# Patient Record
Sex: MALE
[De-identification: names, ages, dates, MRNs, and addresses within clinical notes are randomized per-mention and may not be internally consistent; named-entity substitution may affect disease eponyms.]

---

## 2019-04-01 ENCOUNTER — HOSPITAL ENCOUNTER (OUTPATIENT)
Dept: HOSPITAL 19 - ZCOL.LAB | Age: 34
End: 2019-04-01
Attending: SURGERY
Payer: COMMERCIAL

## 2019-04-01 DIAGNOSIS — Z01.89: Primary | ICD-10-CM

## 2020-09-28 ENCOUNTER — HOSPITAL ENCOUNTER (OUTPATIENT)
Dept: HOSPITAL 19 - ZCOL.LAB | Age: 35
End: 2020-09-28
Attending: SURGERY
Payer: COMMERCIAL

## 2020-09-28 DIAGNOSIS — L02.11: Primary | ICD-10-CM

## 2020-12-02 ENCOUNTER — HOSPITAL ENCOUNTER (OUTPATIENT)
Dept: HOSPITAL 19 - COL.RAD | Age: 35
End: 2020-12-02
Attending: NURSE PRACTITIONER
Payer: COMMERCIAL

## 2020-12-02 DIAGNOSIS — R10.9: ICD-10-CM

## 2020-12-02 DIAGNOSIS — R19.5: Primary | ICD-10-CM

## 2020-12-02 LAB
ALBUMIN SERPL-MCNC: 4.2 GM/DL (ref 3.5–5)
ALP SERPL-CCNC: 97 U/L (ref 50–136)
ALT SERPL-CCNC: 27 U/L (ref 4–49)
ANION GAP SERPL CALC-SCNC: 10 MMOL/L (ref 7–16)
AST SERPL-CCNC: 28 U/L (ref 15–37)
BASOPHILS # BLD: 0.1 10*3/UL (ref 0–0.2)
BASOPHILS NFR BLD AUTO: 0.7 % (ref 0–2)
BILIRUB SERPL-MCNC: 0.5 MG/DL (ref 0–1)
BUN SERPL-MCNC: 9 MG/DL (ref 9–20)
CALCIUM SERPL-MCNC: 9.5 MG/DL (ref 8.4–10.2)
CHLORIDE SERPL-SCNC: 102 MMOL/L (ref 98–107)
CO2 SERPL-SCNC: 28 MMOL/L (ref 22–30)
CREAT SERPL-SCNC: 0.72 UMOL/L (ref 0.66–1.25)
EOSINOPHIL # BLD: 0.3 10*3/UL (ref 0–0.7)
EOSINOPHIL NFR BLD: 2.3 % (ref 0–4)
ERYTHROCYTE [DISTWIDTH] IN BLOOD BY AUTOMATED COUNT: 13.8 % (ref 11.5–14.5)
GLUCOSE SERPL-MCNC: 88 MG/DL (ref 74–106)
GRANULOCYTES # BLD AUTO: 68.7 % (ref 42.2–75.2)
HCT VFR BLD AUTO: 42.1 % (ref 42–52)
HGB BLD-MCNC: 13.8 G/DL (ref 13.5–18)
LYMPHOCYTES # BLD: 3.1 10*3/UL (ref 1.2–3.4)
LYMPHOCYTES NFR BLD: 21.4 % (ref 20–51)
MCH RBC QN AUTO: 28 PG (ref 27–31)
MCHC RBC AUTO-ENTMCNC: 33 G/DL (ref 33–37)
MCV RBC AUTO: 86 FL (ref 80–100)
MONOCYTES # BLD: 1 10*3/UL (ref 0.1–0.6)
MONOCYTES NFR BLD AUTO: 6.6 % (ref 1.7–9.3)
NEUTROPHILS # BLD: 10.1 10*3/UL (ref 1.4–6.5)
PLATELET # BLD AUTO: 437 K/MM3 (ref 130–400)
PMV BLD AUTO: 9.1 FL (ref 7.4–10.4)
POTASSIUM SERPL-SCNC: 3.8 MMOL/L (ref 3.4–5)
PROT SERPL-MCNC: 9.5 GM/DL (ref 6.4–8.2)
RBC # BLD AUTO: 4.9 M/MM3 (ref 4.2–5.6)
SODIUM SERPL-SCNC: 140 MMOL/L (ref 137–145)

## 2021-07-04 ENCOUNTER — HOSPITAL ENCOUNTER (INPATIENT)
Dept: HOSPITAL 19 - COL.ER | Age: 36
LOS: 5 days | Discharge: HOME | DRG: 392 | End: 2021-07-09
Attending: SURGERY | Admitting: SURGERY
Payer: COMMERCIAL

## 2021-07-04 VITALS — SYSTOLIC BLOOD PRESSURE: 130 MMHG | TEMPERATURE: 98.8 F | DIASTOLIC BLOOD PRESSURE: 67 MMHG | HEART RATE: 98 BPM

## 2021-07-04 VITALS — DIASTOLIC BLOOD PRESSURE: 77 MMHG | HEART RATE: 101 BPM | TEMPERATURE: 97.3 F | SYSTOLIC BLOOD PRESSURE: 130 MMHG

## 2021-07-04 VITALS — DIASTOLIC BLOOD PRESSURE: 75 MMHG | HEART RATE: 94 BPM | TEMPERATURE: 98 F | SYSTOLIC BLOOD PRESSURE: 125 MMHG

## 2021-07-04 VITALS — WEIGHT: 315 LBS | HEIGHT: 69.02 IN | BODY MASS INDEX: 46.65 KG/M2

## 2021-07-04 VITALS — HEART RATE: 105 BPM | DIASTOLIC BLOOD PRESSURE: 66 MMHG | TEMPERATURE: 98.5 F | SYSTOLIC BLOOD PRESSURE: 152 MMHG

## 2021-07-04 DIAGNOSIS — K57.20: Primary | ICD-10-CM

## 2021-07-04 DIAGNOSIS — Z87.891: ICD-10-CM

## 2021-07-04 DIAGNOSIS — E66.01: ICD-10-CM

## 2021-07-04 LAB
ALBUMIN SERPL-MCNC: 4 GM/DL (ref 3.5–5)
ALP SERPL-CCNC: 88 U/L (ref 50–136)
ALT SERPL-CCNC: 20 U/L (ref 4–49)
ANION GAP SERPL CALC-SCNC: 6 MMOL/L (ref 7–16)
ANION GAP SERPL CALC-SCNC: 7 MMOL/L (ref 7–16)
AST SERPL-CCNC: 19 U/L (ref 15–37)
BASOPHILS # BLD: 0.1 10*3/UL (ref 0–0.2)
BASOPHILS NFR BLD AUTO: 0.3 % (ref 0–2)
BILIRUB SERPL-MCNC: 0.5 MG/DL (ref 0–1)
BUN SERPL-MCNC: 13 MG/DL (ref 9–20)
BUN SERPL-MCNC: 14 MG/DL (ref 9–20)
CALCIUM SERPL-MCNC: 9.1 MG/DL (ref 8.4–10.2)
CALCIUM SERPL-MCNC: 9.3 MG/DL (ref 8.4–10.2)
CHLORIDE SERPL-SCNC: 104 MMOL/L (ref 98–107)
CHLORIDE SERPL-SCNC: 106 MMOL/L (ref 98–107)
CO2 SERPL-SCNC: 24 MMOL/L (ref 22–30)
CO2 SERPL-SCNC: 24 MMOL/L (ref 22–30)
CREAT SERPL-SCNC: 0.77 UMOL/L (ref 0.66–1.25)
CREAT SERPL-SCNC: 0.84 UMOL/L (ref 0.66–1.25)
CRP SERPL-MCNC: 26.9 MG/DL (ref 0–0.9)
EOSINOPHIL # BLD: 0 10*3/UL (ref 0–0.7)
EOSINOPHIL NFR BLD: 0.1 % (ref 0–4)
ERYTHROCYTE [DISTWIDTH] IN BLOOD BY AUTOMATED COUNT: 14.2 % (ref 11.5–14.5)
GLUCOSE SERPL-MCNC: 115 MG/DL (ref 74–106)
GLUCOSE SERPL-MCNC: 120 MG/DL (ref 74–106)
GRANULOCYTES # BLD AUTO: 84.9 % (ref 42.2–75.2)
HCT VFR BLD AUTO: 42.2 % (ref 42–52)
HGB BLD-MCNC: 13.9 G/DL (ref 13.5–18)
LIPASE SERPL-CCNC: 29 U/L (ref 23–300)
LYMPHOCYTES # BLD: 1.1 10*3/UL (ref 1.2–3.4)
LYMPHOCYTES NFR BLD: 7.2 % (ref 20–51)
MCH RBC QN AUTO: 28 PG (ref 27–31)
MCHC RBC AUTO-ENTMCNC: 33 G/DL (ref 33–37)
MCV RBC AUTO: 85 FL (ref 80–100)
MONOCYTES # BLD: 1.1 10*3/UL (ref 0.1–0.6)
MONOCYTES NFR BLD AUTO: 7.1 % (ref 1.7–9.3)
NEUTROPHILS # BLD: 13 10*3/UL (ref 1.4–6.5)
PH UR STRIP.AUTO: 5 [PH] (ref 5–8)
PLATELET # BLD AUTO: 414 K/MM3 (ref 130–400)
PMV BLD AUTO: 9.1 FL (ref 7.4–10.4)
POTASSIUM SERPL-SCNC: 4.1 MMOL/L (ref 3.4–5)
POTASSIUM SERPL-SCNC: 4.2 MMOL/L (ref 3.4–5)
PROT SERPL-MCNC: 8.9 GM/DL (ref 6.4–8.2)
RBC # BLD AUTO: 4.94 M/MM3 (ref 4.2–5.6)
RBC # UR STRIP.AUTO: (no result) /UL
SODIUM SERPL-SCNC: 135 MMOL/L (ref 137–145)
SODIUM SERPL-SCNC: 136 MMOL/L (ref 137–145)
SP GR UR STRIP.AUTO: > 1.06 (ref 1–1.03)
UA DIPSTICK PNL UR STRIP.AUTO: (no result)
URN COLLECT METHOD CLASS: (no result)

## 2021-07-04 NOTE — NUR
Patient admitted to room 324 from Er. Admission paperwork completed. Patient
had severe pain upon arrival to room, diladid per orders. Ivf as ordered.
Clears provided. Will monitor closely.

## 2021-07-04 NOTE — NUR
Patient resting in bed. He was up to the bathroom able to void dark urine. Ua
sent to lab. Pain elevated after movement. Request pain medication. Dilaudid
per orders. Iv antibioitic as ordered. Tolerates sip of clears. Will report
off to nightnurse

## 2021-07-05 VITALS — DIASTOLIC BLOOD PRESSURE: 72 MMHG | TEMPERATURE: 98.1 F | HEART RATE: 95 BPM | SYSTOLIC BLOOD PRESSURE: 136 MMHG

## 2021-07-05 VITALS — DIASTOLIC BLOOD PRESSURE: 71 MMHG | TEMPERATURE: 97.7 F | HEART RATE: 94 BPM | SYSTOLIC BLOOD PRESSURE: 133 MMHG

## 2021-07-05 VITALS — SYSTOLIC BLOOD PRESSURE: 126 MMHG | TEMPERATURE: 98 F | DIASTOLIC BLOOD PRESSURE: 78 MMHG | HEART RATE: 107 BPM

## 2021-07-05 VITALS — DIASTOLIC BLOOD PRESSURE: 66 MMHG | SYSTOLIC BLOOD PRESSURE: 134 MMHG | HEART RATE: 108 BPM | TEMPERATURE: 97.6 F

## 2021-07-05 VITALS — HEART RATE: 97 BPM | SYSTOLIC BLOOD PRESSURE: 133 MMHG | TEMPERATURE: 98.4 F | DIASTOLIC BLOOD PRESSURE: 69 MMHG

## 2021-07-05 LAB
ERYTHROCYTE [DISTWIDTH] IN BLOOD BY AUTOMATED COUNT: 14.3 % (ref 11.5–14.5)
HCT VFR BLD AUTO: 44.6 % (ref 42–52)
HGB BLD-MCNC: 14.3 G/DL (ref 13.5–18)
LYMPHOCYTES NFR BLD MANUAL: 7 % (ref 20–51)
MCH RBC QN AUTO: 28 PG (ref 27–31)
MCHC RBC AUTO-ENTMCNC: 32 G/DL (ref 33–37)
MCV RBC AUTO: 88 FL (ref 80–100)
METAMYELOCYTES NFR BLD MANUAL: 1 % (ref 0–0)
MONOCYTES NFR BLD: 7 % (ref 1.7–9.3)
NEUTS BAND NFR BLD: 8 % (ref 0–10)
NEUTS SEG NFR BLD MANUAL: 77 % (ref 42–75.2)
PLATELET # BLD AUTO: 433 K/MM3 (ref 130–400)
PLATELET BLD QL SMEAR: (no result)
PMV BLD AUTO: 9.7 FL (ref 7.4–10.4)
RBC # BLD AUTO: 5.08 M/MM3 (ref 4.2–5.6)

## 2021-07-05 NOTE — NUR
Initial visit; Patient thanked  for looking in on him and keeping him
in 's prayers.  offered God's blessings.

## 2021-07-05 NOTE — NUR
REPORT RECEIVED FROM NURSE LOCKHART.  PATIENT RESTING IN BED.  REPORT GOOD PAIN
CONTROL, STILL C/O OF NAUSEA, ZOFRAN GIVEN 15 MINUTE PRIOR.  WILL CONTINUE TO
MONITOR.

## 2021-07-05 NOTE — NUR
Patient resting in bed. Iv to left upper arm dc, patient reports pain at IV
site. Pearl SALDAÑA restarted new IV for patient to R[facility], he tolerated well. Was
able to receive pain, nausea , and antibioics medications. Instructed patient
to take it easy with clear liquids if they are increased abdominal pain. He
continues to describe pain as gas pain.

## 2021-07-05 NOTE — NUR
PT IN BED.  UP INDEPENDENTLY, SHOWERED.  DILAUDID FOR ABDOMINAL PAIN.  PT DID
HAVE ONE EMESIS DURING THE NIGHT.  AFEBRILE.

## 2021-07-05 NOTE — NUR
Plan is to return home.
Patient reports that he resides locally and has a friend Sapphire Ortiz [phone]
[phone] as an EMR contact. Patient shares that he lives alone. Patient
reports that is PCP is Cheryl Agarwal. Patient reports last appointment
in November2020. Patient report that he uses [facility] for Pharmacy
and has the walker while in stay. Patient reports that his goals are to walk
better/steadier prior to DC. Denies having a DPOA. Declines home health care.
Educated on services available. No additional concerns idntified at this time.
Will follow for supports.

## 2021-07-05 NOTE — NUR
Patient taking it easy with liquids. He is concerned about having an empty
stomach-reviewed IVF with him & that they will keep him hydrated. Dialudid as
ordered for pain. Encouraged patient to get up and ambulate the halls.

## 2021-07-06 VITALS — DIASTOLIC BLOOD PRESSURE: 60 MMHG | TEMPERATURE: 98.7 F | HEART RATE: 94 BPM | SYSTOLIC BLOOD PRESSURE: 108 MMHG

## 2021-07-06 VITALS — TEMPERATURE: 98.1 F | HEART RATE: 105 BPM | SYSTOLIC BLOOD PRESSURE: 125 MMHG | DIASTOLIC BLOOD PRESSURE: 70 MMHG

## 2021-07-06 VITALS — HEART RATE: 91 BPM | SYSTOLIC BLOOD PRESSURE: 108 MMHG | DIASTOLIC BLOOD PRESSURE: 57 MMHG | TEMPERATURE: 98.9 F

## 2021-07-06 VITALS — SYSTOLIC BLOOD PRESSURE: 119 MMHG | HEART RATE: 89 BPM | DIASTOLIC BLOOD PRESSURE: 66 MMHG | TEMPERATURE: 98.4 F

## 2021-07-06 VITALS — SYSTOLIC BLOOD PRESSURE: 108 MMHG | TEMPERATURE: 98.6 F | HEART RATE: 94 BPM | DIASTOLIC BLOOD PRESSURE: 60 MMHG

## 2021-07-06 VITALS — SYSTOLIC BLOOD PRESSURE: 117 MMHG | TEMPERATURE: 98.4 F | DIASTOLIC BLOOD PRESSURE: 51 MMHG | HEART RATE: 94 BPM

## 2021-07-06 LAB
ANION GAP SERPL CALC-SCNC: 4 MMOL/L (ref 7–16)
BUN SERPL-MCNC: 15 MG/DL (ref 9–20)
CALCIUM SERPL-MCNC: 8.7 MG/DL (ref 8.4–10.2)
CHLORIDE SERPL-SCNC: 101 MMOL/L (ref 98–107)
CO2 SERPL-SCNC: 25 MMOL/L (ref 22–30)
CREAT SERPL-SCNC: 0.81 UMOL/L (ref 0.66–1.25)
CRP SERPL-MCNC: 34.9 MG/DL (ref 0–0.9)
ERYTHROCYTE [DISTWIDTH] IN BLOOD BY AUTOMATED COUNT: 14 % (ref 11.5–14.5)
GLUCOSE SERPL-MCNC: 100 MG/DL (ref 74–106)
HCT VFR BLD AUTO: 38.8 % (ref 42–52)
HGB BLD-MCNC: 12.9 G/DL (ref 13.5–18)
MCH RBC QN AUTO: 29 PG (ref 27–31)
MCHC RBC AUTO-ENTMCNC: 33 G/DL (ref 33–37)
MCV RBC AUTO: 86 FL (ref 80–100)
PLATELET # BLD AUTO: 379 K/MM3 (ref 130–400)
PMV BLD AUTO: 10 FL (ref 7.4–10.4)
POTASSIUM SERPL-SCNC: 4.1 MMOL/L (ref 3.4–5)
RBC # BLD AUTO: 4.49 M/MM3 (ref 4.2–5.6)
SODIUM SERPL-SCNC: 131 MMOL/L (ref 137–145)

## 2021-07-06 NOTE — NUR
Patient standing at his bedside. Reports a "boil" opened up. Bandaid provided.
Whitney reports he has been in the bathroom an had a loose stool.

## 2021-07-06 NOTE — NUR
met with the patient to follow up. The patient plans to return
home at discharge. The patient expressed concerns about returning home alone
if he has surgery. SW discussed options. SW staffed with the patient's nurse
regarding the patient's concerns. It is still not known if the patient will
have surgery at this time. SW will continue to follow to ensure the safest
discharge possible.

## 2021-07-06 NOTE — NUR
Patient resting in bed.  rounded & plan of care reviewed. Offered to
ambulate halls with patient & he is wanting to try & sleep at this time.
Denies the need for pain medication.  Will monitor.

## 2021-07-06 NOTE — NUR
Patient in positive spirits. Reports he is feeling better. He continues to use
clear liquids sparingly. Nausea medication provided per requst, but he did not
feel like he needed pain medication. He reports flatus & Bms have "opened" him
up. Ivf per order to Rfa. Continue to encourage activity, not wanting to
ambulte halls. Report to [facility].

## 2021-07-07 VITALS — SYSTOLIC BLOOD PRESSURE: 105 MMHG | DIASTOLIC BLOOD PRESSURE: 58 MMHG | HEART RATE: 95 BPM | TEMPERATURE: 98.5 F

## 2021-07-07 VITALS — SYSTOLIC BLOOD PRESSURE: 116 MMHG | HEART RATE: 81 BPM | TEMPERATURE: 97.6 F | DIASTOLIC BLOOD PRESSURE: 46 MMHG

## 2021-07-07 VITALS — HEART RATE: 84 BPM | TEMPERATURE: 97.9 F | SYSTOLIC BLOOD PRESSURE: 101 MMHG | DIASTOLIC BLOOD PRESSURE: 55 MMHG

## 2021-07-07 VITALS — SYSTOLIC BLOOD PRESSURE: 121 MMHG | HEART RATE: 79 BPM | TEMPERATURE: 98.3 F | DIASTOLIC BLOOD PRESSURE: 59 MMHG

## 2021-07-07 VITALS — TEMPERATURE: 98.5 F | DIASTOLIC BLOOD PRESSURE: 56 MMHG | HEART RATE: 90 BPM | SYSTOLIC BLOOD PRESSURE: 105 MMHG

## 2021-07-07 VITALS — DIASTOLIC BLOOD PRESSURE: 56 MMHG | SYSTOLIC BLOOD PRESSURE: 96 MMHG | HEART RATE: 74 BPM | TEMPERATURE: 98.3 F

## 2021-07-07 VITALS — TEMPERATURE: 97.3 F | HEART RATE: 83 BPM | SYSTOLIC BLOOD PRESSURE: 123 MMHG | DIASTOLIC BLOOD PRESSURE: 55 MMHG

## 2021-07-07 LAB
ANION GAP SERPL CALC-SCNC: 1 MMOL/L (ref 7–16)
BUN SERPL-MCNC: 13 MG/DL (ref 9–20)
CALCIUM SERPL-MCNC: 8.3 MG/DL (ref 8.4–10.2)
CHLORIDE SERPL-SCNC: 104 MMOL/L (ref 98–107)
CO2 SERPL-SCNC: 29 MMOL/L (ref 22–30)
CREAT SERPL-SCNC: 0.95 UMOL/L (ref 0.66–1.25)
CRP SERPL-MCNC: 20.6 MG/DL (ref 0–0.9)
ERYTHROCYTE [DISTWIDTH] IN BLOOD BY AUTOMATED COUNT: 14 % (ref 11.5–14.5)
GLUCOSE SERPL-MCNC: 87 MG/DL (ref 74–106)
HCT VFR BLD AUTO: 35 % (ref 42–52)
HGB BLD-MCNC: 11.4 G/DL (ref 13.5–18)
MCH RBC QN AUTO: 28 PG (ref 27–31)
MCHC RBC AUTO-ENTMCNC: 33 G/DL (ref 33–37)
MCV RBC AUTO: 87 FL (ref 80–100)
PLATELET # BLD AUTO: 421 K/MM3 (ref 130–400)
PMV BLD AUTO: 9.5 FL (ref 7.4–10.4)
POTASSIUM SERPL-SCNC: 3.6 MMOL/L (ref 3.4–5)
RBC # BLD AUTO: 4.03 M/MM3 (ref 4.2–5.6)
SODIUM SERPL-SCNC: 134 MMOL/L (ref 137–145)

## 2021-07-07 NOTE — NUR
REPORT RECEIVED FROM OUT GOING NURSE. PATIENT FOUND RESTING IN BED. ON NO
DISTRESS AT THIS TIME.  CALL WITHIN REACH.

## 2021-07-07 NOTE — NUR
PATIENT REPORT FELLING MUCH BETTER TODAY. CONTINUES TO TAKE ONLY SIP OF WATER
TO PREVENT BLODDING.  NAUSEA MEDICATION GIVEN X1 AND DILAUDID IV X2 AS
ORDERED.  PATIENT HAD A SHOWER LAST NIGHT. PATIENT INDEPENDENT IN ROOM.
CONTINUE WITH IVF AND IV ABX. WILL CONTINUE TO MONITOR.

## 2021-07-07 NOTE — NUR
Patient is resting in bed. He just received his dose of antibiotics. Reports
pain of 5 out of 10, in his abdomen. Refuses pain medication. No further needs
at the moment. Call ligth within reach. English

## 2021-07-07 NOTE — NUR
Patient is resting in bed, alert and oriented x 4, vital signs stable, no
pain, nausea or vomiting. Patient discomfort in the abdomen but tolerated. No
further needs at the moment. Call light within reach.

## 2021-07-08 VITALS — SYSTOLIC BLOOD PRESSURE: 119 MMHG | HEART RATE: 87 BPM | TEMPERATURE: 97.5 F | DIASTOLIC BLOOD PRESSURE: 60 MMHG

## 2021-07-08 VITALS — HEART RATE: 81 BPM | SYSTOLIC BLOOD PRESSURE: 111 MMHG | DIASTOLIC BLOOD PRESSURE: 47 MMHG | TEMPERATURE: 97.9 F

## 2021-07-08 VITALS — TEMPERATURE: 98.6 F | DIASTOLIC BLOOD PRESSURE: 50 MMHG | SYSTOLIC BLOOD PRESSURE: 119 MMHG | HEART RATE: 75 BPM

## 2021-07-08 VITALS — HEART RATE: 80 BPM | TEMPERATURE: 98 F | DIASTOLIC BLOOD PRESSURE: 52 MMHG | SYSTOLIC BLOOD PRESSURE: 113 MMHG

## 2021-07-08 VITALS — HEART RATE: 73 BPM | SYSTOLIC BLOOD PRESSURE: 117 MMHG | DIASTOLIC BLOOD PRESSURE: 58 MMHG | TEMPERATURE: 98.2 F

## 2021-07-08 LAB
ANION GAP SERPL CALC-SCNC: 2 MMOL/L (ref 7–16)
BUN SERPL-MCNC: 10 MG/DL (ref 9–20)
CALCIUM SERPL-MCNC: 8.1 MG/DL (ref 8.4–10.2)
CHLORIDE SERPL-SCNC: 108 MMOL/L (ref 98–107)
CO2 SERPL-SCNC: 26 MMOL/L (ref 22–30)
CREAT SERPL-SCNC: 0.77 UMOL/L (ref 0.66–1.25)
CRP SERPL-MCNC: 8.5 MG/DL (ref 0–0.9)
ERYTHROCYTE [DISTWIDTH] IN BLOOD BY AUTOMATED COUNT: 14.2 % (ref 11.5–14.5)
GLUCOSE SERPL-MCNC: 81 MG/DL (ref 74–106)
HCT VFR BLD AUTO: 33.3 % (ref 42–52)
HGB BLD-MCNC: 10.6 G/DL (ref 13.5–18)
MCH RBC QN AUTO: 28 PG (ref 27–31)
MCHC RBC AUTO-ENTMCNC: 32 G/DL (ref 33–37)
MCV RBC AUTO: 88 FL (ref 80–100)
PLATELET # BLD AUTO: 429 K/MM3 (ref 130–400)
PMV BLD AUTO: 9.7 FL (ref 7.4–10.4)
POTASSIUM SERPL-SCNC: 3.5 MMOL/L (ref 3.4–5)
RBC # BLD AUTO: 3.8 M/MM3 (ref 4.2–5.6)
SODIUM SERPL-SCNC: 136 MMOL/L (ref 137–145)

## 2021-07-08 NOTE — NUR
Pt tolerated his low fiber diet, Dr Whitney has been in to see patient, no new
orders at this time.  No other needs verbalized, will continue to monitor

## 2021-07-08 NOTE — NUR
PATIENT RESTING IN BED. ALERT AND ORIENTED.  PATIENT REPORT HIS BELLY IS HEMANT
BETTER.  TOLERATING LOW FIBER DIET.  DENIES NAUSEA/VOMITING TODAY.  REPORTS
MOVING HIS BOWEL MULTIPLE TIME A DAY WITH GOOD RELEIVES FROM HIS BELLY
BLOATING. PATIENT INDEPENDENT IN ROOM. TAKE A SHOWER TONIGHT. PLAN FOR
POSSIBLE DISCHARGE TOMORROW.

## 2021-07-08 NOTE — NUR
Pt resting in bed up on entering the room.  Pt states that he has been getting
up almost hourly to have a bowel movement.  He reports feeling bloated and has
only had approximately 120ml to drink through the night.  Pt reports feeling
steady on his feet.  Pt stated that someone will be dropping off clothes for
him and then he would like to shower.  Informed pt to notify nursing when he
is ready.  No other needs, will continue to monitor

## 2021-07-08 NOTE — NUR
Pt  currently sitting up in the chair.  States that his abd is a little more
uncomfortable right now, but denies the need for pain medication.  He has
ordered something to eat for dinner.  No other needs or complaints, call light
within reach

## 2021-07-09 VITALS — HEART RATE: 72 BPM | DIASTOLIC BLOOD PRESSURE: 49 MMHG | TEMPERATURE: 97.9 F | SYSTOLIC BLOOD PRESSURE: 127 MMHG

## 2021-07-09 VITALS — SYSTOLIC BLOOD PRESSURE: 120 MMHG | DIASTOLIC BLOOD PRESSURE: 58 MMHG | TEMPERATURE: 97.4 F | HEART RATE: 76 BPM

## 2021-07-09 VITALS — DIASTOLIC BLOOD PRESSURE: 52 MMHG | HEART RATE: 79 BPM | SYSTOLIC BLOOD PRESSURE: 125 MMHG | TEMPERATURE: 98 F

## 2021-07-09 VITALS — TEMPERATURE: 97 F | HEART RATE: 72 BPM | SYSTOLIC BLOOD PRESSURE: 124 MMHG | DIASTOLIC BLOOD PRESSURE: 72 MMHG

## 2021-07-09 LAB
ERYTHROCYTE [DISTWIDTH] IN BLOOD BY AUTOMATED COUNT: 14.2 % (ref 11.5–14.5)
HCT VFR BLD AUTO: 35.9 % (ref 42–52)
HGB BLD-MCNC: 11.6 G/DL (ref 13.5–18)
MCH RBC QN AUTO: 29 PG (ref 27–31)
MCHC RBC AUTO-ENTMCNC: 32 G/DL (ref 33–37)
MCV RBC AUTO: 88 FL (ref 80–100)
PLATELET # BLD AUTO: 466 K/MM3 (ref 130–400)
PMV BLD AUTO: 9 FL (ref 7.4–10.4)
RBC # BLD AUTO: 4.06 M/MM3 (ref 4.2–5.6)

## 2021-07-09 NOTE — NUR
Reviewed discharge instructions with pt to include follow up appointment and
prescriptions.  INT removed from right forearm.  Pt escorted out via
wheelchair.

## 2021-07-09 NOTE — NUR
Pt doing well this morning.  He reported that he had a great night and was
able to get some sleep.  He is tolerating the low fiber diet.  Pt plans on
going home today.  No other needs verbalized, will continue to monitor

## 2021-08-24 ENCOUNTER — HOSPITAL ENCOUNTER (INPATIENT)
Dept: HOSPITAL 19 - SURG | Age: 36
LOS: 24 days | Discharge: HOME | DRG: 330 | End: 2021-09-17
Attending: SURGERY | Admitting: SURGERY
Payer: COMMERCIAL

## 2021-08-24 VITALS — BODY MASS INDEX: 46.65 KG/M2 | WEIGHT: 315 LBS | HEIGHT: 69.02 IN

## 2021-08-24 DIAGNOSIS — R11.2: ICD-10-CM

## 2021-08-24 DIAGNOSIS — E86.1: ICD-10-CM

## 2021-08-24 DIAGNOSIS — Z20.822: ICD-10-CM

## 2021-08-24 DIAGNOSIS — K56.7: ICD-10-CM

## 2021-08-24 DIAGNOSIS — Z53.31: ICD-10-CM

## 2021-08-24 DIAGNOSIS — E87.6: ICD-10-CM

## 2021-08-24 DIAGNOSIS — D72.829: ICD-10-CM

## 2021-08-24 DIAGNOSIS — E66.01: ICD-10-CM

## 2021-08-24 DIAGNOSIS — K57.20: Primary | ICD-10-CM

## 2021-08-24 DIAGNOSIS — R00.0: ICD-10-CM

## 2021-08-24 DIAGNOSIS — Z79.2: ICD-10-CM

## 2021-08-24 DIAGNOSIS — J90: ICD-10-CM

## 2021-08-24 DIAGNOSIS — N17.9: ICD-10-CM

## 2021-08-24 PROCEDURE — A4314 CATH W/DRAINAGE 2-WAY LATEX: HCPCS

## 2021-08-24 PROCEDURE — A9284 NON-ELECTRONIC SPIROMETER: HCPCS

## 2021-09-08 VITALS — DIASTOLIC BLOOD PRESSURE: 69 MMHG | SYSTOLIC BLOOD PRESSURE: 125 MMHG | TEMPERATURE: 97.2 F | HEART RATE: 87 BPM

## 2021-09-08 VITALS — SYSTOLIC BLOOD PRESSURE: 122 MMHG | TEMPERATURE: 97.2 F | DIASTOLIC BLOOD PRESSURE: 71 MMHG | HEART RATE: 83 BPM

## 2021-09-08 VITALS — DIASTOLIC BLOOD PRESSURE: 59 MMHG | SYSTOLIC BLOOD PRESSURE: 107 MMHG | HEART RATE: 88 BPM

## 2021-09-08 VITALS — TEMPERATURE: 97.2 F | SYSTOLIC BLOOD PRESSURE: 105 MMHG | DIASTOLIC BLOOD PRESSURE: 54 MMHG | HEART RATE: 93 BPM

## 2021-09-08 VITALS — DIASTOLIC BLOOD PRESSURE: 80 MMHG | SYSTOLIC BLOOD PRESSURE: 112 MMHG | HEART RATE: 89 BPM

## 2021-09-08 VITALS — TEMPERATURE: 97.2 F | HEART RATE: 83 BPM | SYSTOLIC BLOOD PRESSURE: 122 MMHG | DIASTOLIC BLOOD PRESSURE: 71 MMHG

## 2021-09-08 VITALS — HEART RATE: 90 BPM | SYSTOLIC BLOOD PRESSURE: 103 MMHG | DIASTOLIC BLOOD PRESSURE: 58 MMHG

## 2021-09-08 VITALS — TEMPERATURE: 97 F | HEART RATE: 94 BPM | DIASTOLIC BLOOD PRESSURE: 74 MMHG | SYSTOLIC BLOOD PRESSURE: 123 MMHG

## 2021-09-08 VITALS — SYSTOLIC BLOOD PRESSURE: 98 MMHG | HEART RATE: 87 BPM | DIASTOLIC BLOOD PRESSURE: 59 MMHG

## 2021-09-08 VITALS — HEART RATE: 81 BPM | TEMPERATURE: 97.9 F | SYSTOLIC BLOOD PRESSURE: 88 MMHG | DIASTOLIC BLOOD PRESSURE: 66 MMHG

## 2021-09-08 LAB
ANION GAP SERPL CALC-SCNC: 12 MMOL/L (ref 7–16)
BASOPHILS # BLD: 0.1 10*3/UL (ref 0–0.2)
BASOPHILS NFR BLD AUTO: 1.4 % (ref 0–2)
BUN SERPL-MCNC: 10 MG/DL (ref 9–20)
CALCIUM SERPL-MCNC: 9.5 MG/DL (ref 8.4–10.2)
CHLORIDE SERPL-SCNC: 106 MMOL/L (ref 98–107)
CO2 SERPL-SCNC: 22 MMOL/L (ref 22–30)
CREAT SERPL-SCNC: 0.77 UMOL/L (ref 0.66–1.25)
EOSINOPHIL # BLD: 0.2 10*3/UL (ref 0–0.7)
EOSINOPHIL NFR BLD: 2.8 % (ref 0–4)
ERYTHROCYTE [DISTWIDTH] IN BLOOD BY AUTOMATED COUNT: 14.9 % (ref 11.5–14.5)
GLUCOSE SERPL-MCNC: 105 MG/DL (ref 74–106)
GRANULOCYTES # BLD AUTO: 58.2 % (ref 42.2–75.2)
HCT VFR BLD AUTO: 40.7 % (ref 42–52)
HCT VFR BLD AUTO: 40.9 % (ref 42–52)
HGB BLD-MCNC: 12.7 G/DL (ref 13.5–18)
HGB BLD-MCNC: 13.3 G/DL (ref 13.5–18)
LYMPHOCYTES # BLD: 2.6 10*3/UL (ref 1.2–3.4)
LYMPHOCYTES NFR BLD: 31 % (ref 20–51)
MCH RBC QN AUTO: 27 PG (ref 27–31)
MCHC RBC AUTO-ENTMCNC: 33 G/DL (ref 33–37)
MCV RBC AUTO: 82 FL (ref 80–100)
MONOCYTES # BLD: 0.5 10*3/UL (ref 0.1–0.6)
MONOCYTES NFR BLD AUTO: 6.2 % (ref 1.7–9.3)
NEUTROPHILS # BLD: 5 10*3/UL (ref 1.4–6.5)
PLATELET # BLD AUTO: 624 K/MM3 (ref 130–400)
PMV BLD AUTO: 8.4 FL (ref 7.4–10.4)
POTASSIUM SERPL-SCNC: 4 MMOL/L (ref 3.4–5)
RBC # BLD AUTO: 4.97 M/MM3 (ref 4.2–5.6)
SODIUM SERPL-SCNC: 139 MMOL/L (ref 137–145)

## 2021-09-08 PROCEDURE — 0DJD8ZZ INSPECTION OF LOWER INTESTINAL TRACT, VIA NATURAL OR ARTIFICIAL OPENING ENDOSCOPIC: ICD-10-PCS | Performed by: SURGERY

## 2021-09-08 PROCEDURE — 0DN84ZZ RELEASE SMALL INTESTINE, PERCUTANEOUS ENDOSCOPIC APPROACH: ICD-10-PCS | Performed by: SURGERY

## 2021-09-08 PROCEDURE — 02HV33Z INSERTION OF INFUSION DEVICE INTO SUPERIOR VENA CAVA, PERCUTANEOUS APPROACH: ICD-10-PCS | Performed by: SURGERY

## 2021-09-08 PROCEDURE — 8E0W4CZ ROBOTIC ASSISTED PROCEDURE OF TRUNK REGION, PERCUTANEOUS ENDOSCOPIC APPROACH: ICD-10-PCS | Performed by: SURGERY

## 2021-09-08 PROCEDURE — 0DTN0ZZ RESECTION OF SIGMOID COLON, OPEN APPROACH: ICD-10-PCS | Performed by: SURGERY

## 2021-09-08 PROCEDURE — 0DNG4ZZ RELEASE LEFT LARGE INTESTINE, PERCUTANEOUS ENDOSCOPIC APPROACH: ICD-10-PCS | Performed by: SURGERY

## 2021-09-08 NOTE — NUR
Report received, assumed care for night shift. Assessment complete.
A&Ox3-drowsy. Currently on post op vitals-noted to by slightly hypotensive
earlier today but improving. Rating pain 7/10 on pain scale-described as sharp
pains in abdomen on right side as well as shoulder pain. Discussed gas pains
and need for early ambulation to help with pains. Verbalizes understanding. IS
given with instruction on use. Noted to have yellow urine to bangura cath with
sediment. Draining well. IV fluids continue to infuse to right wrist IV. Has
not tolerated any PO. Denies nausea just states he doesnt want anything to
eat. Plan of care discussed for this shift to include medications/up out of
bed/calling for questions/concerns. Verbazlies understanding/denies needs.
Call light in reach. Will monitor.

## 2021-09-08 NOTE — NUR
The patient was taken over to the recovery room to have a block placed prior
to surgery. The patient's chart was taken with him. The patient's belongings
were taken over to the recovery room and will be transferred with the patient
when he is taken to the 3rd floor post operatively.

## 2021-09-08 NOTE — NUR
The patient ambulated back to [place] [facility] independently using a steady gait and
appeared to tolerate the activity well. Vital signs obtained. Consent signed.
Assessment completed. 18G IV started in right hand with one stick, LR infusing
without difficulty. Blood obtained from IV start for labs as ordered. Call
light is within reach. Will continue to monitor the patient.

## 2021-09-09 VITALS — DIASTOLIC BLOOD PRESSURE: 74 MMHG | SYSTOLIC BLOOD PRESSURE: 116 MMHG | HEART RATE: 112 BPM

## 2021-09-09 VITALS — HEART RATE: 81 BPM | SYSTOLIC BLOOD PRESSURE: 117 MMHG | DIASTOLIC BLOOD PRESSURE: 64 MMHG | TEMPERATURE: 98.7 F

## 2021-09-09 VITALS — SYSTOLIC BLOOD PRESSURE: 134 MMHG | HEART RATE: 77 BPM | TEMPERATURE: 98 F | DIASTOLIC BLOOD PRESSURE: 70 MMHG

## 2021-09-09 VITALS — DIASTOLIC BLOOD PRESSURE: 73 MMHG | SYSTOLIC BLOOD PRESSURE: 136 MMHG | HEART RATE: 101 BPM

## 2021-09-09 VITALS — TEMPERATURE: 98.5 F | HEART RATE: 121 BPM | SYSTOLIC BLOOD PRESSURE: 133 MMHG | DIASTOLIC BLOOD PRESSURE: 72 MMHG

## 2021-09-09 VITALS — HEART RATE: 111 BPM | DIASTOLIC BLOOD PRESSURE: 81 MMHG | SYSTOLIC BLOOD PRESSURE: 120 MMHG | TEMPERATURE: 97.7 F

## 2021-09-09 VITALS — TEMPERATURE: 98.7 F | DIASTOLIC BLOOD PRESSURE: 92 MMHG | SYSTOLIC BLOOD PRESSURE: 119 MMHG | HEART RATE: 89 BPM

## 2021-09-09 VITALS — TEMPERATURE: 97.2 F | SYSTOLIC BLOOD PRESSURE: 116 MMHG | DIASTOLIC BLOOD PRESSURE: 74 MMHG | HEART RATE: 112 BPM

## 2021-09-09 VITALS — DIASTOLIC BLOOD PRESSURE: 73 MMHG | SYSTOLIC BLOOD PRESSURE: 136 MMHG | TEMPERATURE: 98 F | HEART RATE: 101 BPM

## 2021-09-09 VITALS — HEART RATE: 89 BPM | SYSTOLIC BLOOD PRESSURE: 119 MMHG | DIASTOLIC BLOOD PRESSURE: 92 MMHG

## 2021-09-09 LAB
ANION GAP SERPL CALC-SCNC: 11 MMOL/L (ref 7–16)
ANION GAP SERPL CALC-SCNC: 9 MMOL/L (ref 7–16)
BUN SERPL-MCNC: 16 MG/DL (ref 9–20)
BUN SERPL-MCNC: 21 MG/DL (ref 9–20)
CALCIUM SERPL-MCNC: 8.8 MG/DL (ref 8.4–10.2)
CALCIUM SERPL-MCNC: 8.8 MG/DL (ref 8.4–10.2)
CHLORIDE SERPL-SCNC: 105 MMOL/L (ref 98–107)
CHLORIDE SERPL-SCNC: 105 MMOL/L (ref 98–107)
CO2 SERPL-SCNC: 20 MMOL/L (ref 22–30)
CO2 SERPL-SCNC: 21 MMOL/L (ref 22–30)
CREAT SERPL-SCNC: 1.24 UMOL/L (ref 0.66–1.25)
CREAT SERPL-SCNC: 1.85 UMOL/L (ref 0.66–1.25)
GLUCOSE SERPL-MCNC: 153 MG/DL (ref 74–106)
GLUCOSE SERPL-MCNC: 154 MG/DL (ref 74–106)
HCT VFR BLD AUTO: 37.5 % (ref 42–52)
HGB BLD-MCNC: 12.2 G/DL (ref 13.5–18)
MAGNESIUM SERPL-MCNC: 1.7 MG/DL (ref 1.6–2.3)
POTASSIUM SERPL-SCNC: 5.3 MMOL/L (ref 3.4–5)
POTASSIUM SERPL-SCNC: 5.3 MMOL/L (ref 3.4–5)
SODIUM SERPL-SCNC: 135 MMOL/L (ref 137–145)
SODIUM SERPL-SCNC: 136 MMOL/L (ref 137–145)

## 2021-09-09 NOTE — NUR
PT CALMER @ THIS TIME. RESPIRATIONS UNLABORED. PT THANKS STAFF FOR PATIENCE.
PT LAYS BACK INTO BED WITH ASSISTANCE. LOVENOX ET MOTRIN ADMINSTERED. PT GIVEN
JELLO TO EAT, MORE ICE WATER REQUESTED.

## 2021-09-09 NOTE — NUR
LIOR attempted to meet with patient twice; both times patient stated he was too
nautious to talk. He requested that this worker return tomorrow.
 
LIOR will f/up 9/10 to assess.

## 2021-09-09 NOTE — NUR
Patient still rating pain 8/10 on pain scale despite receiving PO pain meds as
well as two doses of IV dilaudid. VS more stable now-dilaudid PCA initiated
per dr order. Loading dose given with instructions on use for next dose.
Notified RT of PCA initiation.  Verbalizes understanding/denies questions.

## 2021-09-09 NOTE — NUR
PT IS VOMITING, APPEARS TO BE GREEN GATORADE THAT HE HAS BEEN DRINKING. PT IS
ENCOURAGED TO SLOW DRINKING FLUIDS TO DECREASE VOMITING/NAUSEA. PT INSISTS ON
DRINKING MORE, STATES "DR AND I HAVE TALKED ABOUT THIS BEFORE ET I NEED TO
DRINK MORE TO MAKE MYSELF VOMIT MORE TO CLEAR UP WHAT'S STUCK."

## 2021-09-09 NOTE — NUR
Dressing changed on midline incision. Old drainage on aquacell. Patient
currently has no complaints of nausea and pain has been controlled with PCA.
VSS. IV CDI, fluids infusing. Nurse provided swabs to the patient. No further
needs expressed. Call light within reach

## 2021-09-09 NOTE — NUR
PT IS SITTING UP IN BED, COMPLAINTS OF LEFT SIDED SHOULDER ET FLANK PAIN.
RATES PAIN 10/10. PT REFUSES TO EAT @ THIS TIME, STATES THAT THE PAIN IS TOO
MUCH. PT IS ASSISTED TO CHANGE INTO A CLEAN GOWN ET BED SHEETS ARE CHANGED.

## 2021-09-09 NOTE — NUR
Called with c/o nausea. States he thinks his blood sugar is low. When asked if
he has problems with blood sugar states "No, I just know sometimes when I
havent eaten i feel like its low and i eat and feel better." Blood sugar
checked at this time-128. Requesting OJ but on clear liquid diet and has
complained of nausea. Diet ordered is clear liquids sparingly. Will offer
jello/clear juice.

## 2021-09-09 NOTE — NUR
Called with c/o nausea. Dry heaving when this nurse went to room. Zofran given
per dr cui. States PCA is managing pain much better. Will continue to
monitor.

## 2021-09-09 NOTE — NUR
AQUACELL DRESSING CHANGED ON MIDLINE ABDOMINAL INCISION. JOSEPHINE INTACT, WELL
APPROXIMATED. PT TOLERATES WELL.

## 2021-09-09 NOTE — NUR
Has not tolerated a lot of PO but order to not advance diet/and use sparingly.
Did complain of hiccups this morning. Received zofran for nausea. Switched to
PCA at approx 0050 with good pain relief. VS remained stable. IV fluids
continue to infused at 100ml/hr. Cardoza cath with adequate output. Denies
current needs. Call light in reach. Will monitor.

## 2021-09-09 NOTE — NUR
PT IS VOMITING. EMESIS IS BRIGHT GREEN LIKE GATORADE HE HAS BEEN DRINKING. PT
AGREES TO STOP DRINKING FLUIDS FOR AWHILE. DRESSING ON MIDLINE ABDOMINAL
INCISION IS INTACT, SAME AMOUNT OF DRAINAGE SEEN THROUGH AQUACELL AS WAS SEEN
THIS MORNING. PT AGREES TO ALLOW DRESSING TO BE CHANGED AFTER HIS "STOMACH
CALMS DOWN".

## 2021-09-10 VITALS — HEART RATE: 134 BPM | SYSTOLIC BLOOD PRESSURE: 135 MMHG | DIASTOLIC BLOOD PRESSURE: 73 MMHG | TEMPERATURE: 98.4 F

## 2021-09-10 VITALS — SYSTOLIC BLOOD PRESSURE: 138 MMHG | TEMPERATURE: 98.2 F | DIASTOLIC BLOOD PRESSURE: 62 MMHG | HEART RATE: 114 BPM

## 2021-09-10 VITALS — TEMPERATURE: 98.6 F | DIASTOLIC BLOOD PRESSURE: 71 MMHG | SYSTOLIC BLOOD PRESSURE: 122 MMHG | HEART RATE: 125 BPM

## 2021-09-10 VITALS — SYSTOLIC BLOOD PRESSURE: 131 MMHG | TEMPERATURE: 99 F | HEART RATE: 128 BPM | DIASTOLIC BLOOD PRESSURE: 64 MMHG

## 2021-09-10 VITALS — DIASTOLIC BLOOD PRESSURE: 72 MMHG | SYSTOLIC BLOOD PRESSURE: 133 MMHG | HEART RATE: 69 BPM | TEMPERATURE: 98 F

## 2021-09-10 VITALS — HEART RATE: 134 BPM | DIASTOLIC BLOOD PRESSURE: 73 MMHG | SYSTOLIC BLOOD PRESSURE: 135 MMHG

## 2021-09-10 VITALS — SYSTOLIC BLOOD PRESSURE: 137 MMHG | TEMPERATURE: 97.6 F | DIASTOLIC BLOOD PRESSURE: 60 MMHG | HEART RATE: 110 BPM

## 2021-09-10 VITALS — HEART RATE: 129 BPM | SYSTOLIC BLOOD PRESSURE: 131 MMHG | DIASTOLIC BLOOD PRESSURE: 64 MMHG

## 2021-09-10 VITALS — DIASTOLIC BLOOD PRESSURE: 71 MMHG | HEART RATE: 100 BPM | SYSTOLIC BLOOD PRESSURE: 122 MMHG

## 2021-09-10 LAB
ANION GAP SERPL CALC-SCNC: 8 MMOL/L (ref 7–16)
BUN SERPL-MCNC: 29 MG/DL (ref 9–20)
CALCIUM SERPL-MCNC: 8.2 MG/DL (ref 8.4–10.2)
CHLORIDE SERPL-SCNC: 106 MMOL/L (ref 98–107)
CO2 SERPL-SCNC: 21 MMOL/L (ref 22–30)
CREAT SERPL-SCNC: 2.03 UMOL/L (ref 0.66–1.25)
GLUCOSE SERPL-MCNC: 150 MG/DL (ref 74–106)
HCT VFR BLD AUTO: 35.2 % (ref 42–52)
HGB BLD-MCNC: 11.3 G/DL (ref 13.5–18)
POTASSIUM SERPL-SCNC: 4.8 MMOL/L (ref 3.4–5)
SODIUM SERPL-SCNC: 135 MMOL/L (ref 137–145)

## 2021-09-10 NOTE — NUR
Pt has been nauseated most of the day.  Educated him to take fluids slow and
only sips.  He is refusing oral medicaiton

## 2021-09-10 NOTE — NUR
Notified Dr Whitney of todays labs.  New orders received.  Discussed pain
management with him as he didn't want anything as he was told it would slow
down bowel activity.  Stated that he still needs to be able get up as staying
in bed will also not help.

## 2021-09-10 NOTE — NUR
Dressing removed by Dr Whitney earlier this am.  Incision mostly well
approximated, couple areas it is open with minimal drainage.  Incision cleaned
and new aquacel applied.  Pt did have complaints of pain when I put the bed
flat.  Discussed importance of moving and not staying in one position.

## 2021-09-10 NOTE — NUR
Offered oral pain medication to pt, pt refused, stated that it does not help.
Encouraged him to use his PCA pain medication to help get pain under control.

## 2021-09-10 NOTE — NUR
SW met with patient at b/s after 2 prior failed attempts. Patient states he
lives alone in apartment w/ stairs in [place]; no family or support in
immediate area. No parents but lots of family members in [place].
Patient states his cousin, Debora Gordillo is his DPOA. He has no DME's, states
he is fully independent, works f/t and has insurance through [facility]. Per patient PCP is Cheryl Eisenberg.
 
*Anticipate d/c home with no needs

## 2021-09-10 NOTE — NUR
Pt unable to get IS above 500.  Pt had to be re-educated on proper use.  He
only wanted to do it once, had him do at least 5.  Reminded him that he needs
to be doing it every hour.

## 2021-09-10 NOTE — NUR
Pt recently stood up at side of bed with help from MACT student.  Pt had a lot
of pain complaints and was reported that he did start to have rapid shallow
breathing.  Educated pt that he needs to work on slow deep breaths.  Pt did
and started to appear more relaxed.  Had him do his incentive spirometer.  Pt
did a rapid short breath and then sat it down.  Worked with pt on the proper
was to use the IS.  Informed him that he needs to do it several times an hour
and slow deep breaths to open his lungs.

## 2021-09-10 NOTE — NUR
Patient c/o shortness of breath, SPO2@ 92%, Resp rate 24, Pulse 135, Call
placed to Dr. Fischer NON: decrease fluids to 100ml/hr, telemetry, EKG.
Completed at this time. Patient updated on plan of care and verbalizes
understanding.

## 2021-09-11 VITALS — DIASTOLIC BLOOD PRESSURE: 67 MMHG | TEMPERATURE: 98.7 F | HEART RATE: 118 BPM | SYSTOLIC BLOOD PRESSURE: 146 MMHG

## 2021-09-11 VITALS — SYSTOLIC BLOOD PRESSURE: 142 MMHG | DIASTOLIC BLOOD PRESSURE: 68 MMHG | HEART RATE: 115 BPM

## 2021-09-11 VITALS — SYSTOLIC BLOOD PRESSURE: 143 MMHG | HEART RATE: 120 BPM | DIASTOLIC BLOOD PRESSURE: 82 MMHG | TEMPERATURE: 97.8 F

## 2021-09-11 VITALS — DIASTOLIC BLOOD PRESSURE: 82 MMHG | SYSTOLIC BLOOD PRESSURE: 143 MMHG | HEART RATE: 120 BPM

## 2021-09-11 VITALS — SYSTOLIC BLOOD PRESSURE: 124 MMHG | DIASTOLIC BLOOD PRESSURE: 59 MMHG | TEMPERATURE: 98.3 F | HEART RATE: 116 BPM

## 2021-09-11 VITALS — HEART RATE: 116 BPM | DIASTOLIC BLOOD PRESSURE: 59 MMHG | SYSTOLIC BLOOD PRESSURE: 124 MMHG

## 2021-09-11 VITALS — HEART RATE: 119 BPM | TEMPERATURE: 98.4 F | SYSTOLIC BLOOD PRESSURE: 140 MMHG | DIASTOLIC BLOOD PRESSURE: 65 MMHG

## 2021-09-11 VITALS — TEMPERATURE: 98.8 F | DIASTOLIC BLOOD PRESSURE: 68 MMHG | SYSTOLIC BLOOD PRESSURE: 142 MMHG | HEART RATE: 115 BPM

## 2021-09-11 LAB
ANION GAP SERPL CALC-SCNC: 6 MMOL/L (ref 7–16)
BUN SERPL-MCNC: 18 MG/DL (ref 9–20)
CALCIUM SERPL-MCNC: 8.3 MG/DL (ref 8.4–10.2)
CHLORIDE SERPL-SCNC: 108 MMOL/L (ref 98–107)
CO2 SERPL-SCNC: 24 MMOL/L (ref 22–30)
CREAT SERPL-SCNC: 1.07 UMOL/L (ref 0.66–1.25)
GLUCOSE SERPL-MCNC: 110 MG/DL (ref 74–106)
POTASSIUM SERPL-SCNC: 4 MMOL/L (ref 3.4–5)
SODIUM SERPL-SCNC: 138 MMOL/L (ref 137–145)

## 2021-09-11 NOTE — NUR
Patient is resting quietly, ambulated x 2 in hallway during shift, tolerated
well, telemetry in use ST Tima MD aware, PCA in use- tolerating well, midline
incision with dressing c/d/i, 5 abdominal lap sites wnl, bangura with dark lesa
urine per gravity, VS stable, will continue to monitor.

## 2021-09-11 NOTE — NUR
Patient re-educated several times today about importance of CDB, IS and
ambulation.  Frequently declined ambulation in halls.  No c/o at this time.

## 2021-09-11 NOTE — NUR
Patient alert and oriented, answers questions appropriately.  See assessment.
Abdomen soft, obese.  Bowel sounds absent except for hypoactive in RLQ.
Midline incision and lap sites with staples intact, no drainage noted, RAFI.
Lungs decreased in bases, clear in upper lobes.  CDB and IS re-educated and
encouraged.  Post op exercises reviewed with patient.  Offered x2 to assist
patient to ambulate in [place], declined both times.  Encouraged ambulation and
activity to decrease post op complications.  Patient not interested in
activity.  No other c/o at this time.

## 2021-09-11 NOTE — NUR
Bangura catheter removed at 1000 per Drs order.  Pericare completed.  Voided
200ml immediately after bangura removal.

## 2021-09-12 VITALS — HEART RATE: 100 BPM | TEMPERATURE: 97.6 F | SYSTOLIC BLOOD PRESSURE: 136 MMHG | DIASTOLIC BLOOD PRESSURE: 73 MMHG

## 2021-09-12 VITALS — DIASTOLIC BLOOD PRESSURE: 69 MMHG | SYSTOLIC BLOOD PRESSURE: 134 MMHG | TEMPERATURE: 99.1 F | HEART RATE: 119 BPM

## 2021-09-12 VITALS — SYSTOLIC BLOOD PRESSURE: 142 MMHG | TEMPERATURE: 98.2 F | HEART RATE: 106 BPM | DIASTOLIC BLOOD PRESSURE: 74 MMHG

## 2021-09-12 VITALS — DIASTOLIC BLOOD PRESSURE: 74 MMHG | SYSTOLIC BLOOD PRESSURE: 142 MMHG | HEART RATE: 106 BPM

## 2021-09-12 VITALS — TEMPERATURE: 98.8 F | DIASTOLIC BLOOD PRESSURE: 63 MMHG | HEART RATE: 100 BPM | SYSTOLIC BLOOD PRESSURE: 135 MMHG

## 2021-09-12 VITALS — DIASTOLIC BLOOD PRESSURE: 74 MMHG | HEART RATE: 102 BPM | SYSTOLIC BLOOD PRESSURE: 138 MMHG

## 2021-09-12 VITALS — DIASTOLIC BLOOD PRESSURE: 73 MMHG | SYSTOLIC BLOOD PRESSURE: 136 MMHG | HEART RATE: 100 BPM

## 2021-09-12 VITALS — TEMPERATURE: 98.3 F | DIASTOLIC BLOOD PRESSURE: 78 MMHG | SYSTOLIC BLOOD PRESSURE: 135 MMHG | HEART RATE: 105 BPM

## 2021-09-12 VITALS — HEART RATE: 105 BPM | DIASTOLIC BLOOD PRESSURE: 78 MMHG | SYSTOLIC BLOOD PRESSURE: 135 MMHG

## 2021-09-12 LAB
ANION GAP SERPL CALC-SCNC: 7 MMOL/L (ref 7–16)
ANISOCYTOSIS BLD QL: (no result)
BUN SERPL-MCNC: 13 MG/DL (ref 9–20)
CALCIUM SERPL-MCNC: 8 MG/DL (ref 8.4–10.2)
CHLORIDE SERPL-SCNC: 107 MMOL/L (ref 98–107)
CO2 SERPL-SCNC: 22 MMOL/L (ref 22–30)
CREAT SERPL-SCNC: 0.78 UMOL/L (ref 0.66–1.25)
ERYTHROCYTE [DISTWIDTH] IN BLOOD BY AUTOMATED COUNT: 15.7 % (ref 11.5–14.5)
GLUCOSE SERPL-MCNC: 128 MG/DL (ref 74–106)
HCT VFR BLD AUTO: 27.4 % (ref 42–52)
HGB BLD-MCNC: 8.6 G/DL (ref 13.5–18)
HYPOCHROMIA BLD QL SMEAR: (no result)
LYMPHOCYTES NFR BLD MANUAL: 6 % (ref 20–51)
MCH RBC QN AUTO: 27 PG (ref 27–31)
MCHC RBC AUTO-ENTMCNC: 31 G/DL (ref 33–37)
MCV RBC AUTO: 85 FL (ref 80–100)
MONOCYTES NFR BLD: 7 % (ref 1.7–9.3)
NEUTS BAND NFR BLD: 2 % (ref 0–10)
NEUTS SEG NFR BLD MANUAL: 85 % (ref 42–75.2)
PLATELET # BLD AUTO: 463 K/MM3 (ref 130–400)
PLATELET BLD QL SMEAR: (no result)
PMV BLD AUTO: 9.1 FL (ref 7.4–10.4)
POTASSIUM SERPL-SCNC: 3.5 MMOL/L (ref 3.4–5)
RBC # BLD AUTO: 3.22 M/MM3 (ref 4.2–5.6)
SODIUM SERPL-SCNC: 137 MMOL/L (ref 137–145)

## 2021-09-12 NOTE — NUR
Patient continues to call frequently, for things such as pulling his blanket
up and adjusting his oxygen.  Patient has also refused to do things such as
brush his teeth and wash his own face.  States he is too weak to do so.
Reviewed with patient importance of performing his own self cares and
increasing activity.  Patient curses and becomes upset when encouraged to
perform self cares, states he is too weak and it causes too much pain.  PCA
continues to infuse.

## 2021-09-12 NOTE — NUR
Patient is resting in bed at 45 degrees. Alert and oriented x VSS, Tele in
place. At 3 L 02 high flow nasal canula r/t PCA. Reports constant pain in the
abdomen that increases his need to exhale. Assessment completed. Medications
provided. No further needs at this time. Call light within reach.

## 2021-09-12 NOTE — NUR
LIOR called to follow up with spouse Martha [phone] to secure placement
choice. Voicemail left to return call to LIOR. LIOR will continue to follow up
with inquire about choice in placement.

## 2021-09-12 NOTE — NUR
Patient resting quietly, Dr. Fischer inserted L subclavian triple lumen picc
line during this shift, patient tolerated well, Xray confirmed placement,
tolerating Dilaudid PCA w/o issue, ambulated x 1 in the hallway this shift, VS
stable, abdomen with staples intact to midline incision and 5 lap sites, no
s/s of infection noted, hypoactive BS noted.

## 2021-09-12 NOTE — NUR
Patient alert and oriented, answers questions appropriately.  See assessment.
Abdomen soft, obese, non distended.  Bowel sounds hypoactive x4 quads. No
flatus.  Midline and lap sites to abdomen with staples intact, RAFI, no
drainage or redness noted.  Left subclavian in place, dressing intact, scant
drainage noted, flushes well with blood return noted.  PCA infusing.  Post op
exercises and activity reviewed with patient.  Patient states he remains too
weak to do much activity and would prefer to remain in bed for the day.
Reviewed with patient the importance of activity and complying with post op
exercises.  Patient reluctantly ambulated in halls and sits up in chair.

## 2021-09-12 NOTE — NUR
Patient requests phone call be placed to Dr Fischer to discontinue PCA and start
oral pain medications.  Requests oxycodone 10mg vs Hydrocodone, as oxycodone
works better for him.  Patient also requests to be able to continue the
Dilaudid PCA and initiate oral pain medications.  Reviewed with patient
narcotic safety.  Patient states his pain is 3-4/10, only increases when he
has to stand.  Reviewed with patient the use of narcotics and bowel function.
Patient then states 'I messed up last night then".  When prompted, patient
states "I hit my pain button every half hour last night until I fell asleep, I
didn't really need it, but I wanted to sleep, that probably made my bowel stop
working".  Reviewed with patient use of PCA.  Dr Fischer updated.

## 2021-09-13 VITALS — SYSTOLIC BLOOD PRESSURE: 137 MMHG | DIASTOLIC BLOOD PRESSURE: 73 MMHG | TEMPERATURE: 98.3 F | HEART RATE: 96 BPM

## 2021-09-13 VITALS — SYSTOLIC BLOOD PRESSURE: 137 MMHG | HEART RATE: 96 BPM | DIASTOLIC BLOOD PRESSURE: 73 MMHG

## 2021-09-13 VITALS — HEART RATE: 107 BPM | SYSTOLIC BLOOD PRESSURE: 134 MMHG | TEMPERATURE: 98.5 F | DIASTOLIC BLOOD PRESSURE: 75 MMHG

## 2021-09-13 VITALS — SYSTOLIC BLOOD PRESSURE: 131 MMHG | DIASTOLIC BLOOD PRESSURE: 66 MMHG | HEART RATE: 110 BPM

## 2021-09-13 VITALS — SYSTOLIC BLOOD PRESSURE: 131 MMHG | DIASTOLIC BLOOD PRESSURE: 66 MMHG | TEMPERATURE: 98.1 F | HEART RATE: 110 BPM

## 2021-09-13 VITALS — TEMPERATURE: 97.8 F | DIASTOLIC BLOOD PRESSURE: 74 MMHG | HEART RATE: 97 BPM | SYSTOLIC BLOOD PRESSURE: 128 MMHG

## 2021-09-13 VITALS — TEMPERATURE: 98.1 F | DIASTOLIC BLOOD PRESSURE: 63 MMHG | HEART RATE: 95 BPM | SYSTOLIC BLOOD PRESSURE: 142 MMHG

## 2021-09-13 VITALS — HEART RATE: 18 BPM

## 2021-09-13 VITALS — HEART RATE: 98 BPM | DIASTOLIC BLOOD PRESSURE: 76 MMHG | TEMPERATURE: 98.7 F | SYSTOLIC BLOOD PRESSURE: 138 MMHG

## 2021-09-13 VITALS — HEART RATE: 98 BPM

## 2021-09-13 LAB
ANION GAP SERPL CALC-SCNC: 5 MMOL/L (ref 7–16)
BASOPHILS NFR BLD MANUAL: 1 % (ref 0–2)
BUN SERPL-MCNC: 10 MG/DL (ref 9–20)
CALCIUM SERPL-MCNC: 7.9 MG/DL (ref 8.4–10.2)
CHLORIDE SERPL-SCNC: 106 MMOL/L (ref 98–107)
CO2 SERPL-SCNC: 26 MMOL/L (ref 22–30)
CREAT SERPL-SCNC: 0.75 UMOL/L (ref 0.66–1.25)
ERYTHROCYTE [DISTWIDTH] IN BLOOD BY AUTOMATED COUNT: 15.8 % (ref 11.5–14.5)
GLUCOSE SERPL-MCNC: 92 MG/DL (ref 74–106)
HCT VFR BLD AUTO: 26.8 % (ref 42–52)
HGB BLD-MCNC: 8.4 G/DL (ref 13.5–18)
LYMPHOCYTES NFR BLD MANUAL: 9 % (ref 20–51)
MCH RBC QN AUTO: 27 PG (ref 27–31)
MCHC RBC AUTO-ENTMCNC: 31 G/DL (ref 33–37)
MCV RBC AUTO: 88 FL (ref 80–100)
MONOCYTES NFR BLD: 7 % (ref 1.7–9.3)
NEUTS BAND NFR BLD: 21 % (ref 0–10)
NEUTS SEG NFR BLD MANUAL: 62 % (ref 42–75.2)
PLATELET # BLD AUTO: 470 K/MM3 (ref 130–400)
PLATELET BLD QL SMEAR: (no result)
PMV BLD AUTO: 9.5 FL (ref 7.4–10.4)
POTASSIUM SERPL-SCNC: 3.7 MMOL/L (ref 3.4–5)
RBC # BLD AUTO: 3.06 M/MM3 (ref 4.2–5.6)
SODIUM SERPL-SCNC: 138 MMOL/L (ref 137–145)

## 2021-09-13 NOTE — NUR
Patient has been stable all night. Refused to take Tylenol. Continues with
PCA. He was able to ambulate to the restromm and comeback. Had hygiene. Shift
report will be given to day nurse.

## 2021-09-13 NOTE — NUR
PATIENT UP FOR WALK. WALKED TO END OF BLACK AND BACK TO ROOM. PATIENT UP TO THE
BATHROOM BEFORE THIS. STEADY GAIT. PATIENT BACK IN BED. NO FURTHER NEEDS AT
THIS TIME. CALL LIGHT WITHIN REACH.

## 2021-09-13 NOTE — NUR
PATIENT SITTING UP IN BED. ALERT AND ORIENTED X4. PATIENT HAS CENTRAL LINE TO
LEFT SUBCLAVIAN. PATIENT HAS PCA DILAUDID GOING. PATIENT IS ON CLEAR DIET. HAS
MIDLINE ABDOMINAL INCISION AND 5 LAP SITES OPEN TO AIR. EDGES WELL
APPROXIMATED. PATIENT ON TELE. PATIENT STATES PAIN IS A 5 ON A SCALE OF 10.
PATIENT DENIES FURTHER NEEDS AT THIS TIME. CALL LIGHT WITHIN REACH. HEAD TO
TOE ASSESSMENT COMPETE.

## 2021-09-13 NOTE — NUR
Patient up to the bathroom. Patient agreeable to get out of bed 20 minutes
after he used PCA. He also wanted zofran when he pushed PCA button to prevent
nausea. Patient brushed his teeth sitting on the stool. He was able to void &
pass flatus in the bathroom. Patient was unable to provide his own pericares.
Nurse had to assist with wiping after a small BM. Patient is also belching.
Midline abdomen staples intact. obese abdomen. Patient refused to ambulate
halls at this time. He reports he will later in the afternoon. He also refuses
to sit in the chair at this time. I reviewed importance of activity with him.
I encouraged him to use IS. Will continues to encoaurge him today.

## 2021-09-13 NOTE — NUR
Patient resting in bed. He did ambulate to the nurses station early this
evening as well as use the bathroom, he voided on the floor & had a small Bm.
nurse again provided pericares because patient reports he is unable to do
himself.  Reviewed with him importance of accurate I&O.  Patient did have
dyspnea with exertion, tachycardia & perspiration noted with activity.  Walker
& gaitbelt was used.  another Nurse followed with wheelchair in hallway
because albert reported feeling lightheaded. I did discussed with 
therapy orders for patient. Update given. I did discuss with patient the
potential to progress diet & dc PCA. He is wanting to wait until the am after
he sees the doctor.   aware. Patient has tolerated clear liquids,
but wanting to take zofran because he reports the dilaudid causes nausea. Pca
interval time increased per orders.  Albert made aware, but frustrated by
this. I discussed with him my concerns of his sleepyness. Overall patient
frustrated, continued to try and encourage him & explain plan of care. Bedside
report was given to Migdalia.

## 2021-09-14 VITALS — DIASTOLIC BLOOD PRESSURE: 70 MMHG | TEMPERATURE: 99.8 F | HEART RATE: 103 BPM | SYSTOLIC BLOOD PRESSURE: 145 MMHG

## 2021-09-14 VITALS — TEMPERATURE: 99 F | SYSTOLIC BLOOD PRESSURE: 138 MMHG | DIASTOLIC BLOOD PRESSURE: 67 MMHG | HEART RATE: 107 BPM

## 2021-09-14 VITALS — SYSTOLIC BLOOD PRESSURE: 145 MMHG | HEART RATE: 105 BPM | TEMPERATURE: 98 F | DIASTOLIC BLOOD PRESSURE: 72 MMHG

## 2021-09-14 VITALS — HEART RATE: 106 BPM | TEMPERATURE: 98.3 F | SYSTOLIC BLOOD PRESSURE: 135 MMHG | DIASTOLIC BLOOD PRESSURE: 62 MMHG

## 2021-09-14 VITALS — TEMPERATURE: 98 F | HEART RATE: 98 BPM | SYSTOLIC BLOOD PRESSURE: 144 MMHG | DIASTOLIC BLOOD PRESSURE: 76 MMHG

## 2021-09-14 VITALS — DIASTOLIC BLOOD PRESSURE: 76 MMHG | HEART RATE: 105 BPM | TEMPERATURE: 98.8 F | SYSTOLIC BLOOD PRESSURE: 135 MMHG

## 2021-09-14 VITALS — DIASTOLIC BLOOD PRESSURE: 76 MMHG | SYSTOLIC BLOOD PRESSURE: 144 MMHG | HEART RATE: 98 BPM

## 2021-09-14 LAB
ANION GAP SERPL CALC-SCNC: 6 MMOL/L (ref 7–16)
BUN SERPL-MCNC: 7 MG/DL (ref 9–20)
CALCIUM SERPL-MCNC: 7.7 MG/DL (ref 8.4–10.2)
CHLORIDE SERPL-SCNC: 103 MMOL/L (ref 98–107)
CO2 SERPL-SCNC: 26 MMOL/L (ref 22–30)
CREAT SERPL-SCNC: 0.71 UMOL/L (ref 0.66–1.25)
ERYTHROCYTE [DISTWIDTH] IN BLOOD BY AUTOMATED COUNT: 15.8 % (ref 11.5–14.5)
GLUCOSE SERPL-MCNC: 98 MG/DL (ref 74–106)
HCT VFR BLD AUTO: 26.1 % (ref 42–52)
HGB BLD-MCNC: 8.2 G/DL (ref 13.5–18)
MAGNESIUM SERPL-MCNC: 1.7 MG/DL (ref 1.6–2.3)
MCH RBC QN AUTO: 27 PG (ref 27–31)
MCHC RBC AUTO-ENTMCNC: 31 G/DL (ref 33–37)
MCV RBC AUTO: 85 FL (ref 80–100)
PHOSPHATE SERPL-MCNC: 2.2 MG/DL (ref 2.5–4.5)
PLATELET # BLD AUTO: 463 K/MM3 (ref 130–400)
PMV BLD AUTO: 9.3 FL (ref 7.4–10.4)
POTASSIUM SERPL-SCNC: 3.3 MMOL/L (ref 3.4–5)
RBC # BLD AUTO: 3.07 M/MM3 (ref 4.2–5.6)
SODIUM SERPL-SCNC: 135 MMOL/L (ref 137–145)

## 2021-09-14 NOTE — NUR
Pt sitting up in bed eating dinner at this time.  He stated that his pain is
about how it has been all day.  Pt is a little short with his responses.
Reminded him that he needs to keep moving and working on using his incentive
spirometer.  He is still only able to get it to 500.  Pt continues to keep
taking quick shallow breaths vs deep breaths.  Encouraged pt to keep up with
the activity.

## 2021-09-14 NOTE — NUR
SW met with patient in room and confirmed with patient that he has no spouse.
He resides alone in apartment with stairs. SW provided patient with list of
home health care providers, based on his insurance. Patient was more pleasant
and appeared to be in better spirits. This worker will f/up with patient on
his choice of HHC.
 
*D/C home with home health

## 2021-09-14 NOTE — NUR
Went in to ask pt if he was ready to go for a walk.  He stated that the
physical therapist would be in shortly to see him.  Upon leaving room,
discussed activity with therapy and they stated that pt refused activity.
Informed them that he was ready and needing to walk at this time.  Informed
her that if he refuses to let me know that I can re-educate the importance and
that I could take him for a walk.

## 2021-09-14 NOTE — NUR
When in doing morning assessment, offered to assist pt up to the chair and he
stated that he was not ready and would later.  Just went in pt room to assist
him up to the chair.  Pt refuse to RN that was helping, I then went in and
discussed that he needed to continue with activity and continue improving.  He
was resisting at first, but then did get in to the chair.  Pt did well with a
stand by assist.  Educated pt that he could start advancing his diet in hopes
that he could then start using oral pain medication for pain management.  Pt
over all did well and was no longer upset with the fact that he had to get up
to the chair.  Call light within each, will continue to monitor

## 2021-09-14 NOTE — NUR
Pt resting in bed, appears somewhat drowsy.  He stated that he is not hungry
for clear liquids at this time, but is hoping to get to advance his diet.
Discussed getting rid of the PCA in hopes we can advance his diet and start
more oral pain medication.  Discussed continuing with activity.  Pt was in
agreeance with this.  Denies any other needs, call light within reach

## 2021-09-14 NOTE — NUR
Patient is resting in bed, alert and oriented x 4, VSS, no nausea or vomiting.
Tele in iplace, using 1L of O2. Lap sites x 5 and abdoment with staples, clean
dry and edges well approximated. Complains about pain in the abdomen.
Following PRN medication. Assessmente completed, meds provided. No further
needs at this time. Call light within reach.

## 2021-09-14 NOTE — NUR
Pt up walking in the halls with PT at this time.  Pt using a walker, but is
steady on his feet with just a stand by assist.

## 2021-09-14 NOTE — NUR
Pt sitting up in the chair eating some lunch.  Educated him to not stuff
himself and to take eating slowly.  Re-educated him on room service and
ordering meals.  Discussed pain management with Dr Whitney and pt, turned off
PCA at this time and started oral pain medication.  Pt was okay with this.
Informed him to use his call light when he was finished eating and we would
assist him back to bed.  Call light within reach

## 2021-09-14 NOTE — NUR
PATIENT DID WELL THROUGHOUT THE NIGHT. SLEPT OFF AND ON. PCA REFILLED. PATIENT
AMBULATED TO END OF BLACK. STEADY GAIT. PATIENT UP TO BATHROOM THROUGHOUT THE
NIGHT USING WALKER. PATIENT HAD 2 BOWEL MOVEMENTS LAST NIGHT. IS REQUESTING
DIET CHANGE THIS AM TO HAVE BREAKFAST. NO FURTHER NEEDS AT THIS TIME. CALL
LIGHT WITHIN REACH. WILL REPORT TO DAYSHIFT.

## 2021-09-15 VITALS — HEART RATE: 110 BPM | TEMPERATURE: 98 F | DIASTOLIC BLOOD PRESSURE: 54 MMHG | SYSTOLIC BLOOD PRESSURE: 132 MMHG

## 2021-09-15 VITALS — DIASTOLIC BLOOD PRESSURE: 69 MMHG | HEART RATE: 100 BPM | SYSTOLIC BLOOD PRESSURE: 138 MMHG | TEMPERATURE: 99.1 F

## 2021-09-15 VITALS — TEMPERATURE: 98.8 F | DIASTOLIC BLOOD PRESSURE: 71 MMHG | HEART RATE: 99 BPM | SYSTOLIC BLOOD PRESSURE: 142 MMHG

## 2021-09-15 VITALS — HEART RATE: 93 BPM | DIASTOLIC BLOOD PRESSURE: 73 MMHG | TEMPERATURE: 98.1 F | SYSTOLIC BLOOD PRESSURE: 133 MMHG

## 2021-09-15 VITALS — HEART RATE: 103 BPM | DIASTOLIC BLOOD PRESSURE: 57 MMHG | SYSTOLIC BLOOD PRESSURE: 136 MMHG | TEMPERATURE: 98.5 F

## 2021-09-15 VITALS — SYSTOLIC BLOOD PRESSURE: 134 MMHG | HEART RATE: 98 BPM | DIASTOLIC BLOOD PRESSURE: 71 MMHG | TEMPERATURE: 98.7 F

## 2021-09-15 VITALS — DIASTOLIC BLOOD PRESSURE: 69 MMHG | HEART RATE: 101 BPM | SYSTOLIC BLOOD PRESSURE: 138 MMHG | TEMPERATURE: 99.1 F

## 2021-09-15 VITALS — DIASTOLIC BLOOD PRESSURE: 78 MMHG | HEART RATE: 80 BPM | SYSTOLIC BLOOD PRESSURE: 148 MMHG | TEMPERATURE: 98.1 F

## 2021-09-15 LAB
ANION GAP SERPL CALC-SCNC: 4 MMOL/L (ref 7–16)
BUN SERPL-MCNC: 4 MG/DL (ref 9–20)
CALCIUM SERPL-MCNC: 7.5 MG/DL (ref 8.4–10.2)
CHLORIDE SERPL-SCNC: 101 MMOL/L (ref 98–107)
CO2 SERPL-SCNC: 28 MMOL/L (ref 22–30)
CREAT SERPL-SCNC: 0.59 UMOL/L (ref 0.66–1.25)
GLUCOSE SERPL-MCNC: 93 MG/DL (ref 74–106)
PHOSPHATE SERPL-MCNC: 2.4 MG/DL (ref 2.5–4.5)
POTASSIUM SERPL-SCNC: 2.9 MMOL/L (ref 3.4–5)
SODIUM SERPL-SCNC: 133 MMOL/L (ref 137–145)

## 2021-09-15 NOTE — NUR
LIOR met with patient to discuss his choice of [facility] for D/C plan. Patient's first
choice is [facility]. [facility] and [facility] are alternate choices.
 
SW faxed referral to [facility]. Left  for Bernarda, awaiting response

## 2021-09-15 NOTE — NUR
Pt assessment completed and charted, meds administered per mar. pt A&O,
sitting in recliner at this time. Pt c/o pain to abdomen and rt lower back.
PRN pain meds administered per mar. Pt states he has had BM and is passing
gas. IS used while this nurse in room, up to 750ml 5x. Lap sites and mid abd
site CDI. No further concerns expressed.

## 2021-09-15 NOTE — NUR
Patient has had an unrested night. He complains of pain in the abdomen and his
back. He is taking scheduled meds. SCDs in place. 1L or O2 nasal canula. He
had 2 BM during night. He has been moving from bed to chair to restroom. No
further needs at this time. Shift report will be given.

## 2021-09-15 NOTE — NUR
Bedside shift report received, assumed care for night shift. A&Ox3. Laying in
bed visiting with friend. VS have remained stable. Tolerating diet. Voiding
without difficulty. Will complete assessment at later time due to visitors.
Call light in reach. Will monitor.

## 2021-09-16 VITALS — DIASTOLIC BLOOD PRESSURE: 63 MMHG | HEART RATE: 94 BPM | SYSTOLIC BLOOD PRESSURE: 121 MMHG

## 2021-09-16 VITALS — HEART RATE: 100 BPM | TEMPERATURE: 98.3 F | DIASTOLIC BLOOD PRESSURE: 65 MMHG | SYSTOLIC BLOOD PRESSURE: 113 MMHG

## 2021-09-16 VITALS — TEMPERATURE: 98.4 F | DIASTOLIC BLOOD PRESSURE: 70 MMHG | SYSTOLIC BLOOD PRESSURE: 142 MMHG | HEART RATE: 99 BPM

## 2021-09-16 VITALS — TEMPERATURE: 98 F | HEART RATE: 98 BPM | DIASTOLIC BLOOD PRESSURE: 62 MMHG | SYSTOLIC BLOOD PRESSURE: 131 MMHG

## 2021-09-16 VITALS — HEART RATE: 103 BPM | DIASTOLIC BLOOD PRESSURE: 58 MMHG | SYSTOLIC BLOOD PRESSURE: 131 MMHG | TEMPERATURE: 99.1 F

## 2021-09-16 VITALS — DIASTOLIC BLOOD PRESSURE: 84 MMHG | TEMPERATURE: 97.9 F | HEART RATE: 82 BPM | SYSTOLIC BLOOD PRESSURE: 138 MMHG

## 2021-09-16 LAB
ANION GAP SERPL CALC-SCNC: 4 MMOL/L (ref 7–16)
BUN SERPL-MCNC: 3 MG/DL (ref 9–20)
CALCIUM SERPL-MCNC: 7.3 MG/DL (ref 8.4–10.2)
CHLORIDE SERPL-SCNC: 101 MMOL/L (ref 98–107)
CO2 SERPL-SCNC: 29 MMOL/L (ref 22–30)
CREAT SERPL-SCNC: 0.55 UMOL/L (ref 0.66–1.25)
ERYTHROCYTE [DISTWIDTH] IN BLOOD BY AUTOMATED COUNT: 15.7 % (ref 11.5–14.5)
GLUCOSE SERPL-MCNC: 105 MG/DL (ref 74–106)
HCT VFR BLD AUTO: 23.7 % (ref 42–52)
HGB BLD-MCNC: 7.5 G/DL (ref 13.5–18)
MCH RBC QN AUTO: 26 PG (ref 27–31)
MCHC RBC AUTO-ENTMCNC: 32 G/DL (ref 33–37)
MCV RBC AUTO: 84 FL (ref 80–100)
PLATELET # BLD AUTO: 478 K/MM3 (ref 130–400)
PMV BLD AUTO: 9.2 FL (ref 7.4–10.4)
POTASSIUM SERPL-SCNC: 3.3 MMOL/L (ref 3.4–5)
RBC # BLD AUTO: 2.84 M/MM3 (ref 4.2–5.6)
SODIUM SERPL-SCNC: 134 MMOL/L (ref 137–145)

## 2021-09-16 NOTE — NUR
PT IS LYING IN BED WITH TV ON. LAST DOSE OF POTASSIUM REPLACEMENT ADMINISTERED
WITH PT'S PREFERENCE OF GRAPE JUICE. PT STATES THAT MEDICATION DOES UPSET HIS
STOMACH. HAS NOT VOMITTED. RATES ABDOMINAL PAIN 5/10 @ THIS TIME. SUGGESTED TO
PT THAT TAKING A WALK WHILE HE IS AWAKE WOULD BE A GOOD IDEA. ENCOURAGED PT TO
CALL WHEN HE IS READY TO WALK IN BLACK. PT IS AGREEABLE, STATES THAT HE WILL
CALL AFTER HE USES BR THE NEXT TIME. DENIES OTHER NEEDS. CALL LIGHT WITHIN
REACH.

## 2021-09-16 NOTE — NUR
Pt resting in bed during assessment.  Discussed plan to continue with activity
with sitting up in the chair for meals and walking halls in between.  Pt
stated that his pain is tolerable.  He has been getting up independently in
the room, is steady on his feet without the walker.  Discussed with pt that he
will need to make sure he is staying active when he gets home.  Talked about
him continueing to use the incentive spirometer as well.

## 2021-09-16 NOTE — NUR
PT has done well this afternoon.  Encouraged him to go for another walk this
evening before bed.  Pt has been getting up in his room independently and is
steady on his feet.

## 2021-09-16 NOTE — NUR
Sammie with [facility] states they can accept patient for
skilled nursing and physical therapy upon discharge.
 
Plan: Home with [facility]

## 2021-09-16 NOTE — NUR
Patient assessed around 1940. Alert and oriented x 4, and able to make needs
known. Complained of pain to abdomen, given PRN Roxicodone and Motrin as
requested for pain. Refused APAP. TLC to left chest. Dressing CDI. HRR.
Telemetry in place.  Capillary refill less than 3 seconds. Non-tenting skin
turgor. BSAx4. Abdomen soft and non-tender. 2+ edema BLE. Stables to midline
incision CDI, as well as 5 lab sites. Voices no further questions, needs, or
concerns at this time.

## 2021-09-16 NOTE — NUR
Pt had been stating he would get up and walk on his own, but he was not taking
any initiative to go or ask.  Informed pt that he had to go for a walk before
noon.  Pt was not happy about this and stated that he moves around in the
room, but each time I have been in he has been in bed.  Informed him that he
should really be sitting up in the chair some as well.  Pt is currently up
walking in the [place] and is steady on his feet.

## 2021-09-16 NOTE — NUR
SW faxed p/t, o/t notes to Sammie at [facility]. They have accepted patient to
[facility].
 
*D/C home with [facility]

## 2021-09-17 VITALS — DIASTOLIC BLOOD PRESSURE: 60 MMHG | SYSTOLIC BLOOD PRESSURE: 119 MMHG | HEART RATE: 87 BPM | TEMPERATURE: 97.9 F

## 2021-09-17 VITALS — TEMPERATURE: 97.9 F | HEART RATE: 93 BPM | DIASTOLIC BLOOD PRESSURE: 53 MMHG | SYSTOLIC BLOOD PRESSURE: 129 MMHG

## 2021-09-17 VITALS — HEART RATE: 92 BPM | SYSTOLIC BLOOD PRESSURE: 127 MMHG | TEMPERATURE: 97.4 F | DIASTOLIC BLOOD PRESSURE: 66 MMHG

## 2021-09-17 NOTE — NUR
Pt doing well this morning.  He has been up and walked in the halls without
being prompted.  Pt is excited about hopefully going home.  He is steady on
his feet.  Pt reported not having loose stools anymore.  Breakfast has
arrived, call light within reach

## 2021-09-17 NOTE — NUR
Left triple lumen centeral line remove per protcal patient supine Sertile
dressing change with chloraprep and skin prep applied sutures cut and pulled
cover with two by two gauze then held perssure for two minutes
cover with a sertile tegaderm. patient held well. return bed to normal
postion.

## 2021-09-17 NOTE — NUR
[facility] contacted  and advised that when they contacted patient he refused
all services, stating that he only wanted them to clean his house.

## 2021-09-17 NOTE — NUR
Reviewed discharge instructions with pt to include prescriptions and follow up
appointment.  all questions answered, pt escorted out at this time

## 2021-09-17 NOTE — NUR
Patient has been resting in bed with call light within reach. Did get up and
walk in halls this shift. Has received PRN Motrin and Roxicodone as requested
for pain. Refused APAP. Continues on IV ABXs per orders. Voices no questions,
needs, or concerns at this time.

## 2021-09-17 NOTE — NUR
Pt doing well.  He is getting up walking in the halls independently with no
assisted device.  Pt excited about going home.  Started discussing discharge
instructions with him.  Informed him that the line will be removed prior to
leaving.  Educated that the dressing will have to stay in place until
tomorrow, then he could shower.

## 2021-10-01 ENCOUNTER — HOSPITAL ENCOUNTER (OUTPATIENT)
Dept: HOSPITAL 19 - COL.RAD | Age: 36
End: 2021-10-01
Attending: SURGERY
Payer: COMMERCIAL

## 2021-10-01 DIAGNOSIS — Z90.49: ICD-10-CM

## 2021-10-01 DIAGNOSIS — K65.1: Primary | ICD-10-CM

## 2021-10-01 LAB
ANION GAP SERPL CALC-SCNC: 8 MMOL/L (ref 7–16)
BUN SERPL-MCNC: 6 MG/DL (ref 9–21)
CALCIUM SERPL-MCNC: 8.3 MG/DL (ref 8.4–10.2)
CHLORIDE SERPL-SCNC: 103 MMOL/L (ref 98–107)
CO2 SERPL-SCNC: 25 MMOL/L (ref 22–29)
CREAT SERPL-SCNC: 0.69 MG/DL (ref 0.72–1.25)
ERYTHROCYTE [DISTWIDTH] IN BLOOD BY AUTOMATED COUNT: 17 % (ref 11.5–14.5)
GLUCOSE SERPL-MCNC: 91 MG/DL (ref 70–99)
HCT VFR BLD AUTO: 25.6 % (ref 42–52)
HGB BLD-MCNC: 7.9 G/DL (ref 13.5–18)
MCH RBC QN AUTO: 25 PG (ref 27–31)
MCHC RBC AUTO-ENTMCNC: 31 G/DL (ref 33–37)
MCV RBC AUTO: 82 FL (ref 80–100)
PLATELET # BLD AUTO: 752 K/MM3 (ref 130–400)
PMV BLD AUTO: 8.4 FL (ref 7.4–10.4)
POTASSIUM SERPL-SCNC: 3.5 MMOL/L (ref 3.5–4.5)
RBC # BLD AUTO: 3.12 M/MM3 (ref 4.2–5.6)
SODIUM SERPL-SCNC: 136 MMOL/L (ref 136–145)

## 2021-10-04 ENCOUNTER — HOSPITAL ENCOUNTER (OUTPATIENT)
Dept: HOSPITAL 19 - COL.RAD | Age: 36
End: 2021-10-04
Attending: SURGERY
Payer: COMMERCIAL

## 2021-10-04 VITALS — SYSTOLIC BLOOD PRESSURE: 119 MMHG | HEART RATE: 91 BPM | DIASTOLIC BLOOD PRESSURE: 73 MMHG

## 2021-10-04 VITALS — DIASTOLIC BLOOD PRESSURE: 69 MMHG | SYSTOLIC BLOOD PRESSURE: 145 MMHG | HEART RATE: 93 BPM

## 2021-10-04 VITALS — HEART RATE: 99 BPM | DIASTOLIC BLOOD PRESSURE: 77 MMHG | SYSTOLIC BLOOD PRESSURE: 117 MMHG

## 2021-10-04 VITALS — SYSTOLIC BLOOD PRESSURE: 129 MMHG | HEART RATE: 94 BPM | DIASTOLIC BLOOD PRESSURE: 71 MMHG

## 2021-10-04 VITALS — SYSTOLIC BLOOD PRESSURE: 134 MMHG | HEART RATE: 93 BPM | DIASTOLIC BLOOD PRESSURE: 63 MMHG

## 2021-10-04 VITALS — SYSTOLIC BLOOD PRESSURE: 110 MMHG | DIASTOLIC BLOOD PRESSURE: 97 MMHG | HEART RATE: 93 BPM

## 2021-10-04 VITALS — HEART RATE: 92 BPM | SYSTOLIC BLOOD PRESSURE: 109 MMHG | DIASTOLIC BLOOD PRESSURE: 68 MMHG

## 2021-10-04 VITALS — WEIGHT: 313.72 LBS | HEIGHT: 69.02 IN | BODY MASS INDEX: 46.47 KG/M2

## 2021-10-04 VITALS — SYSTOLIC BLOOD PRESSURE: 129 MMHG | HEART RATE: 92 BPM | DIASTOLIC BLOOD PRESSURE: 72 MMHG

## 2021-10-04 VITALS — SYSTOLIC BLOOD PRESSURE: 131 MMHG | HEART RATE: 92 BPM | DIASTOLIC BLOOD PRESSURE: 77 MMHG

## 2021-10-04 VITALS — SYSTOLIC BLOOD PRESSURE: 121 MMHG | DIASTOLIC BLOOD PRESSURE: 82 MMHG | HEART RATE: 101 BPM

## 2021-10-04 VITALS — DIASTOLIC BLOOD PRESSURE: 67 MMHG | SYSTOLIC BLOOD PRESSURE: 126 MMHG | HEART RATE: 90 BPM

## 2021-10-04 VITALS — SYSTOLIC BLOOD PRESSURE: 131 MMHG | HEART RATE: 91 BPM | DIASTOLIC BLOOD PRESSURE: 69 MMHG

## 2021-10-04 VITALS — HEART RATE: 92 BPM | DIASTOLIC BLOOD PRESSURE: 84 MMHG | SYSTOLIC BLOOD PRESSURE: 133 MMHG

## 2021-10-04 VITALS — DIASTOLIC BLOOD PRESSURE: 74 MMHG | HEART RATE: 92 BPM | SYSTOLIC BLOOD PRESSURE: 132 MMHG

## 2021-10-04 VITALS — HEART RATE: 93 BPM | SYSTOLIC BLOOD PRESSURE: 130 MMHG | DIASTOLIC BLOOD PRESSURE: 80 MMHG

## 2021-10-04 VITALS — SYSTOLIC BLOOD PRESSURE: 119 MMHG | DIASTOLIC BLOOD PRESSURE: 86 MMHG | HEART RATE: 93 BPM

## 2021-10-04 VITALS — HEART RATE: 97 BPM | DIASTOLIC BLOOD PRESSURE: 82 MMHG | SYSTOLIC BLOOD PRESSURE: 145 MMHG

## 2021-10-04 VITALS — SYSTOLIC BLOOD PRESSURE: 100 MMHG | DIASTOLIC BLOOD PRESSURE: 86 MMHG | HEART RATE: 93 BPM

## 2021-10-04 DIAGNOSIS — K65.1: Primary | ICD-10-CM

## 2021-10-04 PROCEDURE — C1729 CATH, DRAINAGE: HCPCS

## 2021-10-04 NOTE — NUR
Drain in place, specimen obtained by Dr Melton.  Brown drainage removed from
abscess and placed in specimen cup.  Total of 70 mls removed by Dr Melton.
Accordian drain attached to drain tube.

## 2021-10-07 ENCOUNTER — HOSPITAL ENCOUNTER (OUTPATIENT)
Dept: HOSPITAL 19 - COL.LAB | Age: 36
End: 2021-10-07
Attending: NURSE PRACTITIONER
Payer: COMMERCIAL

## 2021-10-07 DIAGNOSIS — K65.1: Primary | ICD-10-CM

## 2021-10-07 LAB
ANION GAP SERPL CALC-SCNC: 9 MMOL/L (ref 7–16)
BUN SERPL-MCNC: 6 MG/DL (ref 9–21)
CALCIUM SERPL-MCNC: 8.5 MG/DL (ref 8.4–10.2)
CHLORIDE SERPL-SCNC: 97 MMOL/L (ref 98–107)
CO2 SERPL-SCNC: 24 MMOL/L (ref 22–29)
CREAT SERPL-SCNC: 0.69 MG/DL (ref 0.72–1.25)
CRP SERPL-MCNC: 15.3 MG/DL (ref 0–0.5)
ERYTHROCYTE [DISTWIDTH] IN BLOOD BY AUTOMATED COUNT: 17 % (ref 11.5–14.5)
GLUCOSE SERPL-MCNC: 94 MG/DL (ref 70–99)
HCT VFR BLD AUTO: 27.4 % (ref 42–52)
HGB BLD-MCNC: 8.2 G/DL (ref 13.5–18)
MCH RBC QN AUTO: 24 PG (ref 27–31)
MCHC RBC AUTO-ENTMCNC: 30 G/DL (ref 33–37)
MCV RBC AUTO: 82 FL (ref 80–100)
PLATELET # BLD AUTO: 794 K/MM3 (ref 130–400)
PMV BLD AUTO: 8.4 FL (ref 7.4–10.4)
POTASSIUM SERPL-SCNC: 3.8 MMOL/L (ref 3.5–4.5)
RBC # BLD AUTO: 3.36 M/MM3 (ref 4.2–5.6)
SODIUM SERPL-SCNC: 130 MMOL/L (ref 136–145)

## 2021-10-11 ENCOUNTER — HOSPITAL ENCOUNTER (OUTPATIENT)
Dept: HOSPITAL 19 - COL.RAD | Age: 36
End: 2021-10-11
Attending: SURGERY
Payer: COMMERCIAL

## 2021-10-11 DIAGNOSIS — K65.1: Primary | ICD-10-CM

## 2021-10-11 LAB
ANION GAP SERPL CALC-SCNC: 9 MMOL/L (ref 7–16)
BUN SERPL-MCNC: 5 MG/DL (ref 9–21)
CALCIUM SERPL-MCNC: 9.1 MG/DL (ref 8.4–10.2)
CHLORIDE SERPL-SCNC: 98 MMOL/L (ref 98–107)
CO2 SERPL-SCNC: 24 MMOL/L (ref 22–29)
CREAT SERPL-SCNC: 0.74 MG/DL (ref 0.72–1.25)
ERYTHROCYTE [DISTWIDTH] IN BLOOD BY AUTOMATED COUNT: 17 % (ref 11.5–14.5)
GLUCOSE SERPL-MCNC: 104 MG/DL (ref 70–99)
HCT VFR BLD AUTO: 27.5 % (ref 42–52)
HGB BLD-MCNC: 8.2 G/DL (ref 13.5–18)
MCH RBC QN AUTO: 24 PG (ref 27–31)
MCHC RBC AUTO-ENTMCNC: 30 G/DL (ref 33–37)
MCV RBC AUTO: 81 FL (ref 80–100)
PLATELET # BLD AUTO: 843 K/MM3 (ref 130–400)
PMV BLD AUTO: 8.5 FL (ref 7.4–10.4)
POTASSIUM SERPL-SCNC: 3.9 MMOL/L (ref 3.5–4.5)
RBC # BLD AUTO: 3.38 M/MM3 (ref 4.2–5.6)
SODIUM SERPL-SCNC: 131 MMOL/L (ref 136–145)

## 2021-10-14 ENCOUNTER — HOSPITAL ENCOUNTER (INPATIENT)
Dept: HOSPITAL 19 - SURG | Age: 36
LOS: 12 days | Discharge: HOME | DRG: 393 | End: 2021-10-26
Attending: SURGERY | Admitting: SURGERY
Payer: COMMERCIAL

## 2021-10-14 VITALS — HEART RATE: 86 BPM | SYSTOLIC BLOOD PRESSURE: 123 MMHG | TEMPERATURE: 97.7 F | DIASTOLIC BLOOD PRESSURE: 56 MMHG

## 2021-10-14 VITALS — SYSTOLIC BLOOD PRESSURE: 113 MMHG | HEART RATE: 84 BPM | TEMPERATURE: 97.5 F | DIASTOLIC BLOOD PRESSURE: 62 MMHG

## 2021-10-14 VITALS — DIASTOLIC BLOOD PRESSURE: 78 MMHG | HEART RATE: 93 BPM | SYSTOLIC BLOOD PRESSURE: 127 MMHG

## 2021-10-14 VITALS — SYSTOLIC BLOOD PRESSURE: 121 MMHG | HEART RATE: 91 BPM | DIASTOLIC BLOOD PRESSURE: 74 MMHG

## 2021-10-14 VITALS — HEIGHT: 69.02 IN | WEIGHT: 261.69 LBS | BODY MASS INDEX: 38.76 KG/M2

## 2021-10-14 VITALS — HEART RATE: 93 BPM | SYSTOLIC BLOOD PRESSURE: 114 MMHG | DIASTOLIC BLOOD PRESSURE: 67 MMHG

## 2021-10-14 VITALS — SYSTOLIC BLOOD PRESSURE: 104 MMHG | DIASTOLIC BLOOD PRESSURE: 51 MMHG | HEART RATE: 86 BPM | TEMPERATURE: 97.9 F

## 2021-10-14 VITALS — HEART RATE: 102 BPM | SYSTOLIC BLOOD PRESSURE: 123 MMHG | DIASTOLIC BLOOD PRESSURE: 78 MMHG | TEMPERATURE: 98.4 F

## 2021-10-14 DIAGNOSIS — E43: ICD-10-CM

## 2021-10-14 DIAGNOSIS — D72.829: ICD-10-CM

## 2021-10-14 DIAGNOSIS — K65.1: ICD-10-CM

## 2021-10-14 DIAGNOSIS — K63.2: Primary | ICD-10-CM

## 2021-10-14 DIAGNOSIS — E66.01: ICD-10-CM

## 2021-10-14 DIAGNOSIS — L02.32: ICD-10-CM

## 2021-10-14 LAB
ALBUMIN SERPL-MCNC: 1.7 GM/DL (ref 3.5–5)
ALP SERPL-CCNC: 60 U/L (ref 0–750)
ALT SERPL-CCNC: 10 U/L (ref 0–55)
ANION GAP SERPL CALC-SCNC: 8 MMOL/L (ref 7–16)
AST SERPL-CCNC: 17 U/L (ref 5–34)
BASOPHILS # BLD: 0.1 K/MM3 (ref 0–0.2)
BASOPHILS NFR BLD AUTO: 0.9 % (ref 0–2)
BILIRUB SERPL-MCNC: 0.2 MG/DL (ref 0.2–1.2)
BUN SERPL-MCNC: 6 MG/DL (ref 9–21)
CALCIUM SERPL-MCNC: 8.7 MG/DL (ref 8.4–10.2)
CHLORIDE SERPL-SCNC: 98 MMOL/L (ref 98–107)
CO2 SERPL-SCNC: 25 MMOL/L (ref 22–29)
CREAT SERPL-SCNC: 0.73 MG/DL (ref 0.72–1.25)
CRP SERPL-MCNC: 11.8 MG/DL (ref 0–0.5)
EOSINOPHIL # BLD: 0.1 K/MM3 (ref 0–0.7)
EOSINOPHIL NFR BLD: 0.4 % (ref 0–4)
ERYTHROCYTE [DISTWIDTH] IN BLOOD BY AUTOMATED COUNT: 16.9 % (ref 11.5–14.5)
GLUCOSE SERPL-MCNC: 96 MG/DL (ref 70–99)
GRANULOCYTES # BLD AUTO: 67.1 % (ref 42.2–75.2)
HCT VFR BLD AUTO: 26.4 % (ref 42–52)
HGB BLD-MCNC: 7.9 G/DL (ref 13.5–18)
LYMPHOCYTES # BLD: 2.4 K/MM3 (ref 1.2–3.4)
LYMPHOCYTES NFR BLD: 20.5 % (ref 20–51)
MAGNESIUM SERPL-MCNC: 1.8 MG/DL (ref 1.6–2.6)
MCH RBC QN AUTO: 24 PG (ref 27–31)
MCHC RBC AUTO-ENTMCNC: 30 G/DL (ref 33–37)
MCV RBC AUTO: 81 FL (ref 80–100)
MONOCYTES # BLD: 1.2 K/MM3 (ref 0.1–0.6)
MONOCYTES NFR BLD AUTO: 10.3 % (ref 1.7–9.3)
NEUTROPHILS # BLD: 7.8 K/MM3 (ref 1.4–6.5)
PHOSPHATE SERPL-MCNC: 3.9 MG/DL (ref 2.3–4.7)
PLATELET # BLD AUTO: 726 K/MM3 (ref 130–400)
PMV BLD AUTO: 9 FL (ref 7.4–10.4)
POTASSIUM SERPL-SCNC: 3.6 MMOL/L (ref 3.5–4.5)
PROT SERPL-MCNC: 8.8 GM/DL (ref 6.2–8.1)
RBC # BLD AUTO: 3.28 M/MM3 (ref 4.2–5.6)
SODIUM SERPL-SCNC: 131 MMOL/L (ref 136–145)
TRIGL SERPL-MCNC: 64 MG/DL (ref 0–149)

## 2021-10-14 PROCEDURE — C9113 INJ PANTOPRAZOLE SODIUM, VIA: HCPCS

## 2021-10-14 PROCEDURE — C1729 CATH, DRAINAGE: HCPCS

## 2021-10-14 PROCEDURE — C1892 INTRO/SHEATH,FIXED,PEEL-AWAY: HCPCS

## 2021-10-14 PROCEDURE — C1751 CATH, INF, PER/CENT/MIDLINE: HCPCS

## 2021-10-14 PROCEDURE — C1769 GUIDE WIRE: HCPCS

## 2021-10-14 PROCEDURE — 02HV33Z INSERTION OF INFUSION DEVICE INTO SUPERIOR VENA CAVA, PERCUTANEOUS APPROACH: ICD-10-PCS | Performed by: SURGERY

## 2021-10-14 NOTE — NUR
Dr Jiménez into view images.  Dr Jiménez attempts to reach Dr Whitney.  Unable
to access abscess to place drain.  Pt transported to floor per techs.  [id number]
Report to Cass Cheney LPN, care assumed

## 2021-10-14 NOTE — NUR
Patient has been doing well today. CHanged a dressing to his abdomen drain
site. The old cause was hard and stuck to his skin. Patient stated the same
dressing has been on for about 10 days. The smell was very strong. Did not get
to start patients antibiotics when he go to the floor because his IV would not
flush, had to wait for the PICC line to be placed. Patient has been
independent in the room. TPN started as ordered this afternoon. Patient has
been voiding without issues. On admission patient stated he lost about 45
pounds since last admission. Oriented patient to room. Patient is aware he is
having another procedure tomorrow for his drain. He is aware he is NPO after
midnight. No other changes at this time. Call light within reach. Dr Dent
notified of consult.

## 2021-10-15 VITALS — HEART RATE: 92 BPM | TEMPERATURE: 97.8 F | DIASTOLIC BLOOD PRESSURE: 64 MMHG | SYSTOLIC BLOOD PRESSURE: 110 MMHG

## 2021-10-15 VITALS — HEART RATE: 82 BPM | SYSTOLIC BLOOD PRESSURE: 119 MMHG | TEMPERATURE: 97.5 F | DIASTOLIC BLOOD PRESSURE: 62 MMHG

## 2021-10-15 VITALS — SYSTOLIC BLOOD PRESSURE: 122 MMHG | DIASTOLIC BLOOD PRESSURE: 49 MMHG | HEART RATE: 88 BPM

## 2021-10-15 VITALS — HEART RATE: 91 BPM | DIASTOLIC BLOOD PRESSURE: 61 MMHG | SYSTOLIC BLOOD PRESSURE: 108 MMHG

## 2021-10-15 VITALS — SYSTOLIC BLOOD PRESSURE: 114 MMHG | HEART RATE: 82 BPM | DIASTOLIC BLOOD PRESSURE: 62 MMHG

## 2021-10-15 VITALS — SYSTOLIC BLOOD PRESSURE: 1113 MMHG | HEART RATE: 85 BPM | DIASTOLIC BLOOD PRESSURE: 57 MMHG

## 2021-10-15 VITALS — HEART RATE: 80 BPM | SYSTOLIC BLOOD PRESSURE: 112 MMHG | DIASTOLIC BLOOD PRESSURE: 55 MMHG

## 2021-10-15 VITALS — SYSTOLIC BLOOD PRESSURE: 105 MMHG | DIASTOLIC BLOOD PRESSURE: 61 MMHG | HEART RATE: 71 BPM | TEMPERATURE: 98.4 F

## 2021-10-15 VITALS — SYSTOLIC BLOOD PRESSURE: 107 MMHG | DIASTOLIC BLOOD PRESSURE: 65 MMHG | HEART RATE: 86 BPM

## 2021-10-15 VITALS — HEART RATE: 79 BPM | DIASTOLIC BLOOD PRESSURE: 60 MMHG | SYSTOLIC BLOOD PRESSURE: 115 MMHG

## 2021-10-15 VITALS — HEART RATE: 83 BPM | SYSTOLIC BLOOD PRESSURE: 113 MMHG | DIASTOLIC BLOOD PRESSURE: 55 MMHG

## 2021-10-15 VITALS — HEART RATE: 89 BPM | DIASTOLIC BLOOD PRESSURE: 60 MMHG | SYSTOLIC BLOOD PRESSURE: 102 MMHG

## 2021-10-15 VITALS — SYSTOLIC BLOOD PRESSURE: 112 MMHG | DIASTOLIC BLOOD PRESSURE: 59 MMHG | HEART RATE: 89 BPM

## 2021-10-15 VITALS — HEART RATE: 89 BPM | DIASTOLIC BLOOD PRESSURE: 62 MMHG | SYSTOLIC BLOOD PRESSURE: 116 MMHG

## 2021-10-15 VITALS — SYSTOLIC BLOOD PRESSURE: 115 MMHG | DIASTOLIC BLOOD PRESSURE: 63 MMHG | HEART RATE: 90 BPM

## 2021-10-15 VITALS — HEART RATE: 87 BPM | SYSTOLIC BLOOD PRESSURE: 108 MMHG | DIASTOLIC BLOOD PRESSURE: 52 MMHG

## 2021-10-15 LAB
ANION GAP SERPL CALC-SCNC: 5 MMOL/L (ref 7–16)
BUN SERPL-MCNC: 7 MG/DL (ref 9–21)
CALCIUM SERPL-MCNC: 8.1 MG/DL (ref 8.4–10.2)
CHLORIDE SERPL-SCNC: 102 MMOL/L (ref 98–107)
CO2 SERPL-SCNC: 27 MMOL/L (ref 22–29)
CREAT SERPL-SCNC: 0.6 MG/DL (ref 0.72–1.25)
GLUCOSE SERPL-MCNC: 96 MG/DL (ref 70–99)
MAGNESIUM SERPL-MCNC: 2.1 MG/DL (ref 1.6–2.6)
PHOSPHATE SERPL-MCNC: 3.7 MG/DL (ref 2.3–4.7)
POTASSIUM SERPL-SCNC: 3.9 MMOL/L (ref 3.5–4.5)
SODIUM SERPL-SCNC: 134 MMOL/L (ref 136–145)

## 2021-10-15 NOTE — NUR
Pt to ct per wheelchair.  Pt placed in supine position on ct table.  Monitors
applied and O2 on at 2l/nc.

## 2021-10-15 NOTE — NUR
PATIENT IS ALERT AND ORIENTED X4. SITTING UP IN BED. PATIENT HAS 2 DRAINS IN
THE ABDOMENT TO WOUND DRAIN BAGS. ONE IS UPPER APDOMEN FOR ABCESS DRAINAGE AND
IS BRIGHT RED OUTPUT. OTHER IS LOWER ABDOMEN FOR COLECTOMY WITH BROWN
DRAINAGE. PATIENT HAS TPN GOING. RIGHT UPPER PICC LINE. PATIENT IS INDEPENDENT
IN ROOM. PATIENT REFUSED LOVENOX INJECTION. IT WAS EXPLAINED THAT THIS
PREVENTS BLOOD CLOTS IN THE HOSPITAL. PATIENT ON CLEAR DIET. PATIENT HAS Q6
ACCUCHECK. PATIENT GIVEN PAIN MEDS PER ORDERS. NO FURTHER NEEDS AT THIS TIME.
CALL LIGHT WITHIN REACH. HEAD TO TOE ASSESSMENT COMPLETE.

## 2021-10-15 NOTE — NUR
Patient did well today. He is having increased pain to abdomen drain sites.
Spoke with Dr Whitney to increase pain medications. Patient denies nausea. He
has drainage in his drains but not enough to empty and measure. He has been
independent in the room. He had 2 bowel movemnts, the first one was more
formed but the second was loose he stated. No other changes at this time. Call
light within reach.

## 2021-10-15 NOTE — NUR
ALERT AND OX4. DENIES SOA, CHEST PAIN OR DIZZY. MINIMAL ABD PAIN. DSG TO LOWER
ABD FOUL SMELL BUT C/D/I. TPN GOING ORDER TO RT UPPER ARM PICC. ANTIBOTICS
HUNG PER ORDER. POC DISCUSSED. NPO FOR PROCEDURE IN AM. CALL LIGHT WI REACH.

## 2021-10-15 NOTE — NUR
LIOR met with the patient to discuss discharge plan and re-admit. The patient
recently discharged from the hospital, 9/17, and returned home with [facility].
The patient reports that things at home were going well, but at his second
postoperative visit, he was having some chills and decreased appetite. The
surgeon recommended hospital admission. The patient reports that he did not go
through with home health, because he thought it was just for housekeeping. He
reports that is was only interested in housekeeping while he was recovering.
He reports that he has not seen his provider since before surgery.
 
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 He reports independence with ADLs and does not have any DME. The
patient's primary care provider is NEREYDA Suarez at [facility] in [facility] and he
receives his medications from [facility]. He reports no difficulties
obtaining his meds. The patient does not have a DPOA-HC and he was not
interested in completing one at this time. He reports that he is not ,
does not have any children, and that his parents are not alive. He reports
that he has two brothers: Ramiro and Cesar. They live in [place]. The patient
has his cousin, Debora Gordillo (ph#[phone]), as his emergency contact.
She also likes in [place]. The patient plans to return home upon discharge.
No additional needs at this time. He reports that he is not interested in any
home health. LIOR informed him how housekeeping would be a private pay service.
The patien verbalized understanding.
 
*Discharge plan: home*

## 2021-10-16 VITALS — HEART RATE: 89 BPM | DIASTOLIC BLOOD PRESSURE: 58 MMHG | TEMPERATURE: 98.4 F | SYSTOLIC BLOOD PRESSURE: 110 MMHG

## 2021-10-16 VITALS — DIASTOLIC BLOOD PRESSURE: 63 MMHG | TEMPERATURE: 97.3 F | SYSTOLIC BLOOD PRESSURE: 106 MMHG | HEART RATE: 72 BPM

## 2021-10-16 VITALS — HEART RATE: 77 BPM | TEMPERATURE: 98.4 F | DIASTOLIC BLOOD PRESSURE: 50 MMHG | SYSTOLIC BLOOD PRESSURE: 98 MMHG

## 2021-10-16 VITALS — TEMPERATURE: 97.4 F | SYSTOLIC BLOOD PRESSURE: 113 MMHG | DIASTOLIC BLOOD PRESSURE: 53 MMHG | HEART RATE: 67 BPM

## 2021-10-16 VITALS — SYSTOLIC BLOOD PRESSURE: 96 MMHG | DIASTOLIC BLOOD PRESSURE: 50 MMHG | HEART RATE: 75 BPM | TEMPERATURE: 98.1 F

## 2021-10-16 VITALS — TEMPERATURE: 97.2 F | HEART RATE: 97 BPM | DIASTOLIC BLOOD PRESSURE: 33 MMHG | SYSTOLIC BLOOD PRESSURE: 93 MMHG

## 2021-10-16 LAB
ANION GAP SERPL CALC-SCNC: 6 MMOL/L (ref 7–16)
BUN SERPL-MCNC: 12 MG/DL (ref 9–21)
CALCIUM SERPL-MCNC: 8.1 MG/DL (ref 8.4–10.2)
CHLORIDE SERPL-SCNC: 103 MMOL/L (ref 98–107)
CO2 SERPL-SCNC: 27 MMOL/L (ref 22–29)
CREAT SERPL-SCNC: 0.57 MG/DL (ref 0.72–1.25)
GLUCOSE SERPL-MCNC: 74 MG/DL (ref 70–99)
MAGNESIUM SERPL-MCNC: 2.3 MG/DL (ref 1.6–2.6)
PHOSPHATE SERPL-MCNC: 4.1 MG/DL (ref 2.3–4.7)
POTASSIUM SERPL-SCNC: 4.3 MMOL/L (ref 3.5–4.5)
SODIUM SERPL-SCNC: 136 MMOL/L (ref 136–145)

## 2021-10-16 NOTE — NUR
Patient doing well throughout the day, has been up independently in room.
Abcess drains with minimal output; drain to RLQ with brown output and LLQ with
serosanginous drainage present. Pain medications given per orders.  Patient
denies needs at this time. TPN infusing per orders. No further needs at this
time. Will report off to night shift.

## 2021-10-16 NOTE — NUR
PATIENT DID WELL THROUGHOUT NIGHT. SLEPT MOST OF NIGHT. PAIN MEDS GIVEN PER
ORDERS. NO FURTHER NEEDS AT THIS TIME. CALL LIGHT WITHIN REACH. WILL REPORT TO
DAYSHIFT.

## 2021-10-16 NOTE — NUR
PATIENT IS ALERT AND ORIENTED X4. SITTING UP IN BED.PATIENT HAS 2 DRAINS TO
COMPRESSION ON ABDOMEN. LOWER ONE BRIGHT RED OUTPUT AND IS ABCESS DRAIN. LOWER
ONE HAS BROWN OUTPUT FROM COLECTOMY. PATIENT HAS pICC LINE TO RIGHT UPPER ARM.
FLUSHES WELL AND HAS TPN RUNNING. PATIENT IS ON CLEAR LIQUID DIET AND HAS Q6
ACCUCHECKS. PATIENT DENIES PAIN OR FURTHER NEEDS AT THIS TIME. CALL LIGHT
WITHIN REACH. HEAD TO TOE ASSESSMENT COMPLETE.

## 2021-10-17 VITALS — DIASTOLIC BLOOD PRESSURE: 66 MMHG | TEMPERATURE: 97.6 F | HEART RATE: 93 BPM | SYSTOLIC BLOOD PRESSURE: 107 MMHG

## 2021-10-17 VITALS — SYSTOLIC BLOOD PRESSURE: 113 MMHG | TEMPERATURE: 97.6 F | DIASTOLIC BLOOD PRESSURE: 52 MMHG | HEART RATE: 93 BPM

## 2021-10-17 VITALS — TEMPERATURE: 97.5 F | HEART RATE: 82 BPM | DIASTOLIC BLOOD PRESSURE: 52 MMHG | SYSTOLIC BLOOD PRESSURE: 113 MMHG

## 2021-10-17 VITALS — TEMPERATURE: 98.1 F | DIASTOLIC BLOOD PRESSURE: 58 MMHG | HEART RATE: 86 BPM | SYSTOLIC BLOOD PRESSURE: 112 MMHG

## 2021-10-17 VITALS — SYSTOLIC BLOOD PRESSURE: 110 MMHG | HEART RATE: 88 BPM | DIASTOLIC BLOOD PRESSURE: 56 MMHG | TEMPERATURE: 98.4 F

## 2021-10-17 VITALS — TEMPERATURE: 97.8 F | DIASTOLIC BLOOD PRESSURE: 69 MMHG | SYSTOLIC BLOOD PRESSURE: 124 MMHG | HEART RATE: 92 BPM

## 2021-10-17 LAB
ANION GAP SERPL CALC-SCNC: 7 MMOL/L (ref 7–16)
BUN SERPL-MCNC: 11 MG/DL (ref 9–21)
CALCIUM SERPL-MCNC: 8.3 MG/DL (ref 8.4–10.2)
CHLORIDE SERPL-SCNC: 102 MMOL/L (ref 98–107)
CO2 SERPL-SCNC: 25 MMOL/L (ref 22–29)
CREAT SERPL-SCNC: 0.57 MG/DL (ref 0.72–1.25)
ERYTHROCYTE [DISTWIDTH] IN BLOOD BY AUTOMATED COUNT: 17.5 % (ref 11.5–14.5)
GLUCOSE SERPL-MCNC: 77 MG/DL (ref 70–99)
HCT VFR BLD AUTO: 24.9 % (ref 42–52)
HGB BLD-MCNC: 7.4 G/DL (ref 13.5–18)
MAGNESIUM SERPL-MCNC: 2.1 MG/DL (ref 1.6–2.6)
MCH RBC QN AUTO: 24 PG (ref 27–31)
MCHC RBC AUTO-ENTMCNC: 30 G/DL (ref 33–37)
MCV RBC AUTO: 81 FL (ref 80–100)
PHOSPHATE SERPL-MCNC: 4.4 MG/DL (ref 2.3–4.7)
PLATELET # BLD AUTO: 780 K/MM3 (ref 130–400)
PMV BLD AUTO: 8.9 FL (ref 7.4–10.4)
POTASSIUM SERPL-SCNC: 4.3 MMOL/L (ref 3.5–4.5)
RBC # BLD AUTO: 3.06 M/MM3 (ref 4.2–5.6)
SODIUM SERPL-SCNC: 134 MMOL/L (ref 136–145)

## 2021-10-17 NOTE — NUR
PATIENT ASKING FOR ZOFRAN AND DILAUDID. NOT GIVEN BECAUSE BLOOD PRESSURE WAS
90S OVER 50S. PATIENT WAS TOLD HE HAD PERCOCET AVAILIABLE PER ORDERS BUT
REFUSED IT. Tripple neg breast cancer s/p lumpectomy, XRT and chemotherapy.   Now complaining of ackward lumpiness/firmness that is new?   CTA PE neg for findings but does show axillary lymph node abnormality, possibly scar tissue?  MRI chest ordered.  Bone scan ordered, given elevated Alkaline phosphatase,

## 2021-10-17 NOTE — NUR
Pt doing ok this morning.  He has slept in some as he stated that he did not
get much sleep last night.  Pt is refusing the lovenox, reports that he
understands the importance, but does not want it.  Encouraged him to stay
active as much as possible and he stated that he knew that and does.
Bilateral drains are intact.  Pt reports that he is taking in some fluids.
Pain is currently 5/10, but states that he would like it to be 2/10 to be
adequately functional, but also is refusing pain medication at this time.
Call light within reach

## 2021-10-17 NOTE — NUR
PATIENT DID WELL THROUGHOUT NIGHT. SLEPT OFF AND ON. GIVEN PAIN MEDS PER
ORDERS. MOT GIVEN DILAUDID DUE TO LOW BLOOD PRESSURES. NO FURTHER NEEDS AT
THIS TIME. WILL REPORT TO DAYSHIFT

## 2021-10-17 NOTE — NUR
Discussed the importance of activity and stated that he should really be up
walking in the halls at least every couple of hours.  Pt stated that he would
walk when he feels like it, but does not feel like it now.  When anything
about his care or what is importance is dicussed, he gets an annoyed look on
his face and clearly has no interest in hearing what is said.  Pt verbalized
no needs, call light within reach

## 2021-10-17 NOTE — NUR
Pt has gone for a couple walks in the halls this afternoon.  PRN pain
medication given mid afternoon and pt reported that it did help.  Minimal
output from drains.  Pt tolerating clear liquids.  No other needs

## 2021-10-18 VITALS — DIASTOLIC BLOOD PRESSURE: 66 MMHG | SYSTOLIC BLOOD PRESSURE: 120 MMHG | HEART RATE: 85 BPM | TEMPERATURE: 97.8 F

## 2021-10-18 VITALS — SYSTOLIC BLOOD PRESSURE: 124 MMHG | DIASTOLIC BLOOD PRESSURE: 54 MMHG | HEART RATE: 91 BPM | TEMPERATURE: 97.5 F

## 2021-10-18 VITALS — SYSTOLIC BLOOD PRESSURE: 114 MMHG | TEMPERATURE: 98.1 F | DIASTOLIC BLOOD PRESSURE: 65 MMHG | HEART RATE: 88 BPM

## 2021-10-18 VITALS — DIASTOLIC BLOOD PRESSURE: 52 MMHG | TEMPERATURE: 98 F | HEART RATE: 85 BPM | SYSTOLIC BLOOD PRESSURE: 103 MMHG

## 2021-10-18 VITALS — TEMPERATURE: 98.2 F | SYSTOLIC BLOOD PRESSURE: 104 MMHG | HEART RATE: 84 BPM | DIASTOLIC BLOOD PRESSURE: 51 MMHG

## 2021-10-18 VITALS — TEMPERATURE: 97.6 F | SYSTOLIC BLOOD PRESSURE: 112 MMHG | HEART RATE: 83 BPM | DIASTOLIC BLOOD PRESSURE: 58 MMHG

## 2021-10-18 LAB
ANION GAP SERPL CALC-SCNC: 7 MMOL/L (ref 7–16)
BUN SERPL-MCNC: 11 MG/DL (ref 9–21)
CALCIUM SERPL-MCNC: 8.6 MG/DL (ref 8.4–10.2)
CHLORIDE SERPL-SCNC: 103 MMOL/L (ref 98–107)
CO2 SERPL-SCNC: 24 MMOL/L (ref 22–29)
CREAT SERPL-SCNC: 0.57 MG/DL (ref 0.72–1.25)
GLUCOSE SERPL-MCNC: 72 MG/DL (ref 70–99)
MAGNESIUM SERPL-MCNC: 2.1 MG/DL (ref 1.6–2.6)
PHOSPHATE SERPL-MCNC: 3.8 MG/DL (ref 2.3–4.7)
POTASSIUM SERPL-SCNC: 4.1 MMOL/L (ref 3.5–4.5)
SODIUM SERPL-SCNC: 134 MMOL/L (ref 136–145)

## 2021-10-18 NOTE — NUR
Patient in bed resting. Alert and oriented x 3. Assessment complete. Patient
has 2x drains, RLQ with brown drainage present; LLQ with serosangunous
drainage. Patient denies pain at this time. Denies further needs at this time.

## 2021-10-18 NOTE — NUR
Patient doing well throughout the day. Nausea this afternoon after attempting
jello, refused zofran at that time.  TPN and fluids infusing per orders to KIM
PICC line. Patient denies pain at this time. Will report off to night shift.

## 2021-10-19 VITALS — DIASTOLIC BLOOD PRESSURE: 61 MMHG | HEART RATE: 75 BPM | TEMPERATURE: 97.9 F | SYSTOLIC BLOOD PRESSURE: 113 MMHG

## 2021-10-19 VITALS — TEMPERATURE: 98.3 F | DIASTOLIC BLOOD PRESSURE: 68 MMHG | HEART RATE: 93 BPM | SYSTOLIC BLOOD PRESSURE: 113 MMHG

## 2021-10-19 VITALS — DIASTOLIC BLOOD PRESSURE: 55 MMHG | HEART RATE: 86 BPM | TEMPERATURE: 97.5 F | SYSTOLIC BLOOD PRESSURE: 107 MMHG

## 2021-10-19 VITALS — DIASTOLIC BLOOD PRESSURE: 65 MMHG | HEART RATE: 70 BPM | SYSTOLIC BLOOD PRESSURE: 185 MMHG | TEMPERATURE: 98.1 F

## 2021-10-19 VITALS — HEART RATE: 85 BPM | DIASTOLIC BLOOD PRESSURE: 44 MMHG | SYSTOLIC BLOOD PRESSURE: 98 MMHG | TEMPERATURE: 98.2 F

## 2021-10-19 VITALS — DIASTOLIC BLOOD PRESSURE: 54 MMHG | TEMPERATURE: 98 F | HEART RATE: 93 BPM | SYSTOLIC BLOOD PRESSURE: 116 MMHG

## 2021-10-19 VITALS — HEART RATE: 85 BPM | SYSTOLIC BLOOD PRESSURE: 111 MMHG | DIASTOLIC BLOOD PRESSURE: 59 MMHG | TEMPERATURE: 98 F

## 2021-10-19 LAB
ANISOCYTOSIS BLD QL: (no result)
EOSINOPHIL NFR BLD: 4 % (ref 0–4)
ERYTHROCYTE [DISTWIDTH] IN BLOOD BY AUTOMATED COUNT: 18.5 % (ref 11.5–14.5)
HCT VFR BLD AUTO: 24.7 % (ref 42–52)
HGB BLD-MCNC: 7.5 G/DL (ref 13.5–18)
HYPOCHROMIA BLD QL SMEAR: (no result)
LYMPHOCYTES NFR BLD MANUAL: 26 % (ref 20–51)
MCH RBC QN AUTO: 25 PG (ref 27–31)
MCHC RBC AUTO-ENTMCNC: 30 G/DL (ref 33–37)
MCV RBC AUTO: 81 FL (ref 80–100)
MONOCYTES NFR BLD: 8 % (ref 1.7–9.3)
NEUTS BAND NFR BLD: 4 % (ref 0–10)
NEUTS SEG NFR BLD MANUAL: 58 % (ref 42–75.2)
PLATELET # BLD AUTO: 755 K/MM3 (ref 130–400)
PLATELET BLD QL SMEAR: (no result)
PMV BLD AUTO: 8.8 FL (ref 7.4–10.4)
RBC # BLD AUTO: 3.05 M/MM3 (ref 4.2–5.6)

## 2021-10-19 NOTE — NUR
1145- Pt. returned per cart to Rm 345 post MRI and sleepy but continues to try
to pull at bangura cath. Awake briefly and back to sleep. 1200-Pt. says he is
too tired to eat. Apnea q1-2 mins [id number].

## 2021-10-19 NOTE — NUR
PATIENT IS ALERT AND ORIENTED X3. SITTING UP IN BED WATCHING TV. PATIENT
RATING PAIN AT 6 ON 0-10 SCALE. PAIN MEDS GIVEN PER ORDERS. PATIENT HAS 2
ACCORDIAN DRAINS TO ABDOMEN. ONE DRAINING REDDISH AND THE OTHER DRAINING
BROWN. PATIENT HAS PICC TO RIGHT UPPER ARM, FLUSHING WELL. PATIENT DENIES ANY
NAUSEA OR VOMITING TODAY. TPN INFUSING. PATIENT ON MECHANICAL SOFT DIET.
PATIENT GIVEN NEW ICE WATER. PATIENT DENIES FURTHER NEEDS AT THIS TIME. CALL
LIGHT WITHIN REACH. HEAD TO TOE ASSESSMENT COMPLETE.

## 2021-10-19 NOTE — NUR
Patient doing well throughout the day. Able to tolerate soup this afternoon
without nausea. Patient denies pain throughout the day. TPN and antibiotics
infusing per orders. Patient denies needs at this time. Will report off to
night shift.

## 2021-10-19 NOTE — NUR
Contacted Dr. Whitney, patient would like accuechecks changed. Per Dr. wolfe to
do accuchecks once daily

## 2021-10-19 NOTE — NUR
PATIENT HAS ABCESS DRAINING TO BACKSIDE. COVERED WITH GAUZE AND TAPE. PATIENT
SAYS THIS IS NORMAL FOR HIM

## 2021-10-20 VITALS — TEMPERATURE: 98.3 F | HEART RATE: 89 BPM | SYSTOLIC BLOOD PRESSURE: 105 MMHG | DIASTOLIC BLOOD PRESSURE: 52 MMHG

## 2021-10-20 VITALS — DIASTOLIC BLOOD PRESSURE: 56 MMHG | TEMPERATURE: 98.1 F | HEART RATE: 99 BPM | SYSTOLIC BLOOD PRESSURE: 107 MMHG

## 2021-10-20 VITALS — TEMPERATURE: 98.2 F | HEART RATE: 87 BPM | DIASTOLIC BLOOD PRESSURE: 57 MMHG | SYSTOLIC BLOOD PRESSURE: 106 MMHG

## 2021-10-20 VITALS — DIASTOLIC BLOOD PRESSURE: 61 MMHG | HEART RATE: 82 BPM | TEMPERATURE: 97.9 F | SYSTOLIC BLOOD PRESSURE: 117 MMHG

## 2021-10-20 VITALS — TEMPERATURE: 98 F | DIASTOLIC BLOOD PRESSURE: 52 MMHG | SYSTOLIC BLOOD PRESSURE: 105 MMHG | HEART RATE: 78 BPM

## 2021-10-20 LAB
ALBUMIN SERPL-MCNC: 1.9 GM/DL (ref 3.5–5)
ALP SERPL-CCNC: 67 U/L (ref 40–150)
ALT SERPL-CCNC: 10 U/L (ref 0–55)
ANION GAP SERPL CALC-SCNC: 8 MMOL/L (ref 7–16)
AST SERPL-CCNC: 18 U/L (ref 5–34)
BASOPHILS # BLD: 0.1 K/MM3 (ref 0–0.2)
BASOPHILS NFR BLD AUTO: 1 % (ref 0–2)
BILIRUB SERPL-MCNC: 0.2 MG/DL (ref 0.2–1.2)
BUN SERPL-MCNC: 16 MG/DL (ref 9–21)
CALCIUM SERPL-MCNC: 9 MG/DL (ref 8.4–10.2)
CHLORIDE SERPL-SCNC: 103 MMOL/L (ref 98–107)
CHOLEST SPEC-SCNC: 66 MG/DL (ref 0–199)
CO2 SERPL-SCNC: 23 MMOL/L (ref 22–29)
CREAT SERPL-SCNC: 0.63 MG/DL (ref 0.72–1.25)
EOSINOPHIL # BLD: 0.2 K/MM3 (ref 0–0.7)
EOSINOPHIL NFR BLD: 1.4 % (ref 0–4)
ERYTHROCYTE [DISTWIDTH] IN BLOOD BY AUTOMATED COUNT: 19 % (ref 11.5–14.5)
GLUCOSE SERPL-MCNC: 78 MG/DL (ref 70–99)
GRANULOCYTES # BLD AUTO: 61.2 % (ref 42.2–75.2)
HCT VFR BLD AUTO: 27.8 % (ref 42–52)
HGB BLD-MCNC: 8.3 G/DL (ref 13.5–18)
LYMPHOCYTES # BLD: 3.4 K/MM3 (ref 1.2–3.4)
LYMPHOCYTES NFR BLD: 27.4 % (ref 20–51)
MAGNESIUM SERPL-MCNC: 2 MG/DL (ref 1.6–2.6)
MCH RBC QN AUTO: 24 PG (ref 27–31)
MCHC RBC AUTO-ENTMCNC: 30 G/DL (ref 33–37)
MCV RBC AUTO: 81 FL (ref 80–100)
MONOCYTES # BLD: 1.1 K/MM3 (ref 0.1–0.6)
MONOCYTES NFR BLD AUTO: 8.7 % (ref 1.7–9.3)
NEUTROPHILS # BLD: 7.6 K/MM3 (ref 1.4–6.5)
PHOSPHATE SERPL-MCNC: 4.2 MG/DL (ref 2.3–4.7)
PLATELET # BLD AUTO: 792 K/MM3 (ref 130–400)
PMV BLD AUTO: 8.9 FL (ref 7.4–10.4)
POTASSIUM SERPL-SCNC: 4.1 MMOL/L (ref 3.5–4.5)
PROT SERPL-MCNC: 8.8 GM/DL (ref 6.2–8.1)
RBC # BLD AUTO: 3.42 M/MM3 (ref 4.2–5.6)
SODIUM SERPL-SCNC: 134 MMOL/L (ref 136–145)
SPECIMEN VOL 12H UR: 1.13 L
TRIGL SERPL-MCNC: 88 MG/DL (ref 0–149)
UREA 24H UR-SCNC: 8 G/24 HR

## 2021-10-20 NOTE — NUR
Patient resting in bed, in positive spirits. Going to attempt to work on
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 He reports feeling better than he has in days. Will
report off to nightnurse

## 2021-10-20 NOTE — NUR
Patient resting in bed. I discussed importance of activity with patient, he
reports moving around in the room independently.  I encouraged ambulation in
the hallways.  Patient also refused Lovenox-I discussed importance of this.
Still refusing.  Picc to Rue-Tpn per order. Dietician reviewed plan of care
with patient.  Patient did not eat breakfast, he was upset about not being
able to have sausauge biscut, reports that is all he wanted.  Accordian abcess
drains x2, RLQ purulent drainge. LUQ reddish draiange.  Patient reports pain
as manges 3/10 from perococet. Will monitor.

## 2021-10-20 NOTE — NUR
Patient up in room, lunch ordered. Right buttocks drainge noted, patient
reports this is his chronic HS. Not new for him

## 2021-10-20 NOTE — NUR
Patient assessed around 1920. Denied pain and discomfort. PICC to RUE. TPN
running per orders. Accordian drains to RLQ with purulent drainage, and LUQ
with serous drainage. Managing drains independently. Patient continues on IV
ABX per orders. Voices no questions, needs, or concerns at this time. Resting
in bed with call light within reach.

## 2021-10-21 VITALS — TEMPERATURE: 98 F | DIASTOLIC BLOOD PRESSURE: 53 MMHG | SYSTOLIC BLOOD PRESSURE: 100 MMHG | HEART RATE: 80 BPM

## 2021-10-21 VITALS — TEMPERATURE: 98.6 F | SYSTOLIC BLOOD PRESSURE: 100 MMHG | DIASTOLIC BLOOD PRESSURE: 49 MMHG | HEART RATE: 94 BPM

## 2021-10-21 VITALS — SYSTOLIC BLOOD PRESSURE: 118 MMHG | TEMPERATURE: 98.4 F | DIASTOLIC BLOOD PRESSURE: 65 MMHG | HEART RATE: 108 BPM

## 2021-10-21 VITALS — TEMPERATURE: 98.3 F | HEART RATE: 99 BPM | DIASTOLIC BLOOD PRESSURE: 56 MMHG | SYSTOLIC BLOOD PRESSURE: 107 MMHG

## 2021-10-21 VITALS — DIASTOLIC BLOOD PRESSURE: 54 MMHG | HEART RATE: 87 BPM | TEMPERATURE: 98.1 F | SYSTOLIC BLOOD PRESSURE: 115 MMHG

## 2021-10-21 VITALS — TEMPERATURE: 97.7 F | DIASTOLIC BLOOD PRESSURE: 57 MMHG | HEART RATE: 86 BPM | SYSTOLIC BLOOD PRESSURE: 114 MMHG

## 2021-10-21 NOTE — NUR
Patient received PRN Percocet once this shift for pain to abdomen. Patient
reports no drainage emptied from either accordian draings to abdomen.
Continues on TPN per orders. Did have small amount of emesis during the night.
Reports that he believes pain medication had made him nauseous. Denies needing
anything for nausea at this time. Resting in bed with call light within reach.
Voices no questions, needs, or concerns at this time.

## 2021-10-21 NOTE — NUR
Patient resting in bed. Not feeling as well today as prior day. He has not
wanted pain medication. He has been slightly nauseated. Will let him rest.

## 2021-10-21 NOTE — NUR
PT RESTING IN BED. NO PAIN OR NAUSEA AT THIS TIME. TPN TO PICC INFUSING AT
85CC/HR. CALL LIGHT IN REACH.

## 2021-10-21 NOTE — NUR
rounded & plan of care reviewed. Patient feeling much better this
evening after zofran & a pain pill. Encouraged activity. Will report off to
nightnurse

## 2021-10-22 VITALS — DIASTOLIC BLOOD PRESSURE: 56 MMHG | TEMPERATURE: 98.6 F | SYSTOLIC BLOOD PRESSURE: 112 MMHG | HEART RATE: 94 BPM

## 2021-10-22 VITALS — HEART RATE: 90 BPM | DIASTOLIC BLOOD PRESSURE: 46 MMHG | SYSTOLIC BLOOD PRESSURE: 100 MMHG | TEMPERATURE: 98.3 F

## 2021-10-22 VITALS — SYSTOLIC BLOOD PRESSURE: 103 MMHG | DIASTOLIC BLOOD PRESSURE: 47 MMHG | HEART RATE: 81 BPM | TEMPERATURE: 98.6 F

## 2021-10-22 VITALS — HEART RATE: 94 BPM | SYSTOLIC BLOOD PRESSURE: 110 MMHG | TEMPERATURE: 98.6 F | DIASTOLIC BLOOD PRESSURE: 51 MMHG

## 2021-10-22 VITALS — HEART RATE: 73 BPM | DIASTOLIC BLOOD PRESSURE: 47 MMHG | SYSTOLIC BLOOD PRESSURE: 113 MMHG | TEMPERATURE: 97.8 F

## 2021-10-22 VITALS — HEART RATE: 79 BPM | SYSTOLIC BLOOD PRESSURE: 97 MMHG | DIASTOLIC BLOOD PRESSURE: 51 MMHG

## 2021-10-22 VITALS — SYSTOLIC BLOOD PRESSURE: 113 MMHG | HEART RATE: 94 BPM | TEMPERATURE: 98.1 F | DIASTOLIC BLOOD PRESSURE: 54 MMHG

## 2021-10-22 LAB
ANION GAP SERPL CALC-SCNC: 6 MMOL/L (ref 7–16)
BUN SERPL-MCNC: 13 MG/DL (ref 9–21)
CALCIUM SERPL-MCNC: 8.6 MG/DL (ref 8.4–10.2)
CHLORIDE SERPL-SCNC: 104 MMOL/L (ref 98–107)
CO2 SERPL-SCNC: 25 MMOL/L (ref 22–29)
CREAT SERPL-SCNC: 0.59 MG/DL (ref 0.72–1.25)
GLUCOSE SERPL-MCNC: 90 MG/DL (ref 70–99)
MAGNESIUM SERPL-MCNC: 2 MG/DL (ref 1.6–2.6)
PHOSPHATE SERPL-MCNC: 3.8 MG/DL (ref 2.3–4.7)
POTASSIUM SERPL-SCNC: 3.7 MMOL/L (ref 3.5–4.5)
SODIUM SERPL-SCNC: 135 MMOL/L (ref 136–145)

## 2021-10-22 NOTE — NUR
PT IN BED. HAS TPN INFUSING TO RT PICC WITHOUT PROBLEM. HAS ACCORDIAN DRAIN TO
LEFT UPPER ABD AND RT LOWER ABD. LEFT WITH SCANT SANGUINOUS DRAINAGE, RT WITH
SCANT PURULENT DRAINAGE. LAP SITES HEALED. REFUSING LOVENOX INJ FOR DVT. TAKES
ZOFRAN 4MG IVP WITH PERCOCET 1 TAB. NARCOTIC MAKES HIM NAUSEOUS. INDEPENDENT
IN ROOM AND HALLWAY.

## 2021-10-23 VITALS — HEART RATE: 79 BPM | DIASTOLIC BLOOD PRESSURE: 44 MMHG | TEMPERATURE: 98.4 F | SYSTOLIC BLOOD PRESSURE: 94 MMHG

## 2021-10-23 VITALS — HEART RATE: 76 BPM | DIASTOLIC BLOOD PRESSURE: 56 MMHG | SYSTOLIC BLOOD PRESSURE: 105 MMHG | TEMPERATURE: 97.6 F

## 2021-10-23 VITALS — DIASTOLIC BLOOD PRESSURE: 47 MMHG | HEART RATE: 82 BPM | TEMPERATURE: 97.9 F | SYSTOLIC BLOOD PRESSURE: 97 MMHG

## 2021-10-23 VITALS — TEMPERATURE: 98.2 F | DIASTOLIC BLOOD PRESSURE: 55 MMHG | HEART RATE: 89 BPM | SYSTOLIC BLOOD PRESSURE: 100 MMHG

## 2021-10-23 VITALS — TEMPERATURE: 98.1 F | HEART RATE: 84 BPM | SYSTOLIC BLOOD PRESSURE: 107 MMHG | DIASTOLIC BLOOD PRESSURE: 49 MMHG

## 2021-10-23 VITALS — DIASTOLIC BLOOD PRESSURE: 59 MMHG | TEMPERATURE: 97.9 F | SYSTOLIC BLOOD PRESSURE: 109 MMHG | HEART RATE: 87 BPM

## 2021-10-23 NOTE — NUR
PT IN BED, DOING HOMEWORK ON COMPUTER. REFUSES LOVENOX AGAIN TONIGHT AND
REPORTS HE IS FULLY AWARE OF POTENTIAL FOR BLOOD CLOTS. HAS PICC TO RT UPPER
ARM, TPN INFUSING WITHOUT PROBLEM. REPORTS LLQ PAIN, WILL WANT PAIN MEDS AT
2300. DRAINS TO LUQ AND RLQ COMPRESSED.

## 2021-10-23 NOTE — NUR
Patient resting in bed. Pain improved after Percocet. Tpn started per orders
at cyclic rate, verified with Uche SALDAÑA.

## 2021-10-23 NOTE — NUR
Patient resting in bed. encouraged activity. He continues to refuse lovenox.
He tolerated lunch. Pain pill & zofran per request. He is managing his drains
independently. Will monitor.

## 2021-10-24 VITALS — DIASTOLIC BLOOD PRESSURE: 46 MMHG | TEMPERATURE: 97.9 F | SYSTOLIC BLOOD PRESSURE: 95 MMHG | HEART RATE: 86 BPM

## 2021-10-24 VITALS — SYSTOLIC BLOOD PRESSURE: 111 MMHG | TEMPERATURE: 98 F | HEART RATE: 94 BPM | DIASTOLIC BLOOD PRESSURE: 67 MMHG

## 2021-10-24 VITALS — SYSTOLIC BLOOD PRESSURE: 100 MMHG | HEART RATE: 89 BPM | TEMPERATURE: 98 F | DIASTOLIC BLOOD PRESSURE: 54 MMHG

## 2021-10-24 VITALS — TEMPERATURE: 98.2 F | DIASTOLIC BLOOD PRESSURE: 53 MMHG | SYSTOLIC BLOOD PRESSURE: 105 MMHG | HEART RATE: 79 BPM

## 2021-10-24 VITALS — SYSTOLIC BLOOD PRESSURE: 96 MMHG | DIASTOLIC BLOOD PRESSURE: 57 MMHG | HEART RATE: 94 BPM | TEMPERATURE: 98.5 F

## 2021-10-24 NOTE — NUR
PT ASKING FOR PERCOCET AND ZOFRAN, GIVEN AT THIS TIME. IS ALERT AND ORIENTED
X4. HAS RT PICC WITH TPN INFUSING. RLQ AND LUQ ACCORDIAN DRAINS COMPRESSED. UP
AD KATHERINE IN ROOM. EATING ALL OF MEAL TRAYS.

## 2021-10-25 VITALS — DIASTOLIC BLOOD PRESSURE: 68 MMHG | TEMPERATURE: 97.9 F | SYSTOLIC BLOOD PRESSURE: 97 MMHG | HEART RATE: 88 BPM

## 2021-10-25 VITALS — HEART RATE: 92 BPM | TEMPERATURE: 98 F | DIASTOLIC BLOOD PRESSURE: 43 MMHG | SYSTOLIC BLOOD PRESSURE: 102 MMHG

## 2021-10-25 VITALS — TEMPERATURE: 98.2 F | DIASTOLIC BLOOD PRESSURE: 40 MMHG | SYSTOLIC BLOOD PRESSURE: 90 MMHG | HEART RATE: 79 BPM

## 2021-10-25 VITALS — TEMPERATURE: 98 F | DIASTOLIC BLOOD PRESSURE: 89 MMHG | SYSTOLIC BLOOD PRESSURE: 121 MMHG | HEART RATE: 92 BPM

## 2021-10-25 VITALS — DIASTOLIC BLOOD PRESSURE: 58 MMHG | SYSTOLIC BLOOD PRESSURE: 111 MMHG | TEMPERATURE: 97.9 F | HEART RATE: 89 BPM

## 2021-10-25 VITALS — SYSTOLIC BLOOD PRESSURE: 110 MMHG | HEART RATE: 91 BPM | TEMPERATURE: 97.8 F | DIASTOLIC BLOOD PRESSURE: 51 MMHG

## 2021-10-25 LAB
ANION GAP SERPL CALC-SCNC: 8 MMOL/L (ref 7–16)
ANISOCYTOSIS BLD QL: (no result)
BASOPHILS NFR BLD MANUAL: 6 % (ref 0–2)
BUN SERPL-MCNC: 12 MG/DL (ref 9–21)
CALCIUM SERPL-MCNC: 9.1 MG/DL (ref 8.4–10.2)
CHLORIDE SERPL-SCNC: 104 MMOL/L (ref 98–107)
CO2 SERPL-SCNC: 23 MMOL/L (ref 22–29)
CREAT SERPL-SCNC: 0.64 MG/DL (ref 0.72–1.25)
CRP SERPL-MCNC: 5.25 MG/DL (ref 0–0.5)
EOSINOPHIL NFR BLD: 5 % (ref 0–4)
ERYTHROCYTE [DISTWIDTH] IN BLOOD BY AUTOMATED COUNT: 20.7 % (ref 11.5–14.5)
GLUCOSE SERPL-MCNC: 85 MG/DL (ref 70–99)
HCT VFR BLD AUTO: 26.3 % (ref 42–52)
HGB BLD-MCNC: 7.6 G/DL (ref 13.5–18)
HYPOCHROMIA BLD QL SMEAR: (no result)
LYMPHOCYTES NFR BLD MANUAL: 26 % (ref 20–51)
MAGNESIUM SERPL-MCNC: 2.2 MG/DL (ref 1.6–2.6)
MCH RBC QN AUTO: 25 PG (ref 27–31)
MCHC RBC AUTO-ENTMCNC: 29 G/DL (ref 33–37)
MCV RBC AUTO: 85 FL (ref 80–100)
MONOCYTES NFR BLD: 3 % (ref 1.7–9.3)
NEUTS SEG NFR BLD MANUAL: 60 % (ref 42–75.2)
PHOSPHATE SERPL-MCNC: 4.6 MG/DL (ref 2.3–4.7)
PLATELET # BLD AUTO: 252 K/MM3 (ref 130–400)
PLATELET BLD QL SMEAR: NORMAL
PMV BLD AUTO: 10.4 FL (ref 7.4–10.4)
POTASSIUM SERPL-SCNC: 4.2 MMOL/L (ref 3.5–4.5)
RBC # BLD AUTO: 3.08 M/MM3 (ref 4.2–5.6)
SODIUM SERPL-SCNC: 135 MMOL/L (ref 136–145)

## 2021-10-25 NOTE — NUR
LIOR was informed by RN that when patient is discharged home with IV
antibiotics he will be using the [facility]. LIOR faxed paperwork
to [facility] and spoke with Olga regarding patient's insurance authorization and
it is in progress.

## 2021-10-25 NOTE — NUR
Patient assessed around 2000. Alert and oriented x 4, and able to make needs
known. Complained of level 5 pain to abdomen. Given PRN Percocet at requested.
Patient also given PRN Zofran for nausea as requested for nausea. Accordian
drain to right lower quadrant of abdomen is intact, draining purulent drainage
via compression. Denies having any questions, needs, or concerns at this time.
Resting in bed with call light within reach.

## 2021-10-26 VITALS — DIASTOLIC BLOOD PRESSURE: 50 MMHG | TEMPERATURE: 98.3 F | SYSTOLIC BLOOD PRESSURE: 111 MMHG | HEART RATE: 65 BPM

## 2021-10-26 VITALS — DIASTOLIC BLOOD PRESSURE: 51 MMHG | HEART RATE: 76 BPM | TEMPERATURE: 98.2 F | SYSTOLIC BLOOD PRESSURE: 101 MMHG

## 2021-10-26 VITALS — TEMPERATURE: 97.7 F | SYSTOLIC BLOOD PRESSURE: 103 MMHG | HEART RATE: 79 BPM | DIASTOLIC BLOOD PRESSURE: 61 MMHG

## 2021-10-26 VITALS — HEART RATE: 82 BPM | DIASTOLIC BLOOD PRESSURE: 54 MMHG | SYSTOLIC BLOOD PRESSURE: 106 MMHG | TEMPERATURE: 97.9 F

## 2021-10-26 NOTE — NUR
Call made to [facility] to check if prior auth is needed for outpatient IV
antibiotics. Per [facility] rep, Greer:
Invanz (), no auth required
Fluconazole (), no auth required

## 2021-10-26 NOTE — NUR
Pt. progressing w/ plan of care. Report received from PIPER Mcgraw. Pt. awake
in bed. Pt. denies needs at this time. Call light and belongings in reach.

## 2021-10-26 NOTE — NUR
Patient has denied having pain and discomfort when asked, and has voiced no
complaints of pain or discomfort since beginning of shift. Has voiced no
questions, needs, or concerns this shift. Resting in bed with call light
within reach.

## 2021-10-26 NOTE — NUR
The pt. was discharged home. Pt. left floor via foot w/ hospital staff member,
Ellie. Pt.'s PICC remains in place for outpatient antibiotics. The pt. is
agreeable w/ the plan of care, which entails following up w/ general surgery,
following up w/ infectious disease, and coming to [facility] express
unit for the next two weeks for outpatient antibiotics.

## 2021-10-27 VITALS — HEART RATE: 98 BPM | TEMPERATURE: 97.8 F | DIASTOLIC BLOOD PRESSURE: 67 MMHG | SYSTOLIC BLOOD PRESSURE: 102 MMHG

## 2021-10-28 ENCOUNTER — HOSPITAL ENCOUNTER (OUTPATIENT)
Dept: HOSPITAL 19 - COL.VAS | Age: 36
End: 2021-10-28
Attending: SURGERY
Payer: COMMERCIAL

## 2021-10-28 VITALS — SYSTOLIC BLOOD PRESSURE: 135 MMHG | HEART RATE: 106 BPM | TEMPERATURE: 98.5 F | DIASTOLIC BLOOD PRESSURE: 65 MMHG

## 2021-10-28 DIAGNOSIS — I82.811: Primary | ICD-10-CM

## 2021-10-28 NOTE — NUR
Gordy RN at Dr Whitney's office was contacted prior to dressing change due to
amount of drainage around site, foul smell of drainage, and because clamp
holding drain is partially detatched due to amount of drainage. She states
that amount and smell of drainage is known. She states that clamp can be
removed if needed. and drain secured with tape or tegaderm. Pt has follow up
with their office Tuesday, and sees wound care clinic Thursday.

## 2021-10-28 NOTE — NUR
Soiled segment of device stablilization clip that is no longer sticking to
skin removed. Microfoam tape used to further stablilize drain tubing. Gauze
sponge x2 applied. Area covered with large tegaderm for further
stablilization. Stitch at insertion site intented to secure drain is no
longer intact. Pt is aware of this, and will exercise additional caution to
avoid pulling at drain.
He exits dept to go to radiology for ordered vascular study.

## 2021-10-29 VITALS — HEART RATE: 98 BPM | DIASTOLIC BLOOD PRESSURE: 68 MMHG | SYSTOLIC BLOOD PRESSURE: 104 MMHG | TEMPERATURE: 99.2 F

## 2021-10-31 VITALS — SYSTOLIC BLOOD PRESSURE: 109 MMHG | DIASTOLIC BLOOD PRESSURE: 75 MMHG | HEART RATE: 95 BPM | TEMPERATURE: 97.9 F

## 2021-11-01 ENCOUNTER — HOSPITAL ENCOUNTER (OUTPATIENT)
Dept: HOSPITAL 19 - COL.RAD | Age: 36
End: 2021-11-01
Attending: SURGERY
Payer: COMMERCIAL

## 2021-11-01 VITALS — HEART RATE: 102 BPM | DIASTOLIC BLOOD PRESSURE: 77 MMHG | TEMPERATURE: 96.7 F | SYSTOLIC BLOOD PRESSURE: 112 MMHG

## 2021-11-01 DIAGNOSIS — K65.1: Primary | ICD-10-CM

## 2021-11-01 DIAGNOSIS — K57.30: ICD-10-CM

## 2021-11-01 DIAGNOSIS — Z97.8: ICD-10-CM

## 2021-11-01 DIAGNOSIS — Z90.49: ICD-10-CM

## 2021-11-01 LAB
ALBUMIN SERPL-MCNC: 2.4 GM/DL (ref 3.5–5)
ALP SERPL-CCNC: 78 U/L (ref 40–150)
ALT SERPL-CCNC: 11 U/L (ref 0–55)
ANION GAP SERPL CALC-SCNC: 9 MMOL/L (ref 7–16)
AST SERPL-CCNC: 13 U/L (ref 5–34)
BASOPHILS # BLD: 0.1 K/MM3 (ref 0–0.2)
BASOPHILS NFR BLD AUTO: 1.5 % (ref 0–2)
BILIRUB SERPL-MCNC: 0.2 MG/DL (ref 0.2–1.2)
BUN SERPL-MCNC: 6 MG/DL (ref 9–21)
CALCIUM SERPL-MCNC: 9.4 MG/DL (ref 8.4–10.2)
CHLORIDE SERPL-SCNC: 104 MMOL/L (ref 98–107)
CO2 SERPL-SCNC: 24 MMOL/L (ref 22–29)
CREAT SERPL-SCNC: 0.65 MG/DL (ref 0.72–1.25)
CRP SERPL-MCNC: 6.15 MG/DL (ref 0–0.5)
EOSINOPHIL # BLD: 0.4 K/MM3 (ref 0–0.7)
EOSINOPHIL NFR BLD: 4.9 % (ref 0–4)
ERYTHROCYTE [DISTWIDTH] IN BLOOD BY AUTOMATED COUNT: 19.1 % (ref 11.5–14.5)
ERYTHROCYTE [SEDIMENTATION RATE] IN BLOOD: > 140 MM/HR (ref 0–15)
GLUCOSE SERPL-MCNC: 90 MG/DL (ref 70–99)
GRANULOCYTES # BLD AUTO: 55.1 % (ref 42.2–75.2)
HCT VFR BLD AUTO: 29.8 % (ref 42–52)
HGB BLD-MCNC: 8.8 G/DL (ref 13.5–18)
LYMPHOCYTES # BLD: 2.4 K/MM3 (ref 1.2–3.4)
LYMPHOCYTES NFR BLD: 30.9 % (ref 20–51)
MCH RBC QN AUTO: 25 PG (ref 27–31)
MCHC RBC AUTO-ENTMCNC: 30 G/DL (ref 33–37)
MCV RBC AUTO: 84 FL (ref 80–100)
MONOCYTES # BLD: 0.6 K/MM3 (ref 0.1–0.6)
MONOCYTES NFR BLD AUTO: 7.3 % (ref 1.7–9.3)
NEUTROPHILS # BLD: 4.3 K/MM3 (ref 1.4–6.5)
PLATELET # BLD AUTO: 548 K/MM3 (ref 130–400)
PMV BLD AUTO: 9 FL (ref 7.4–10.4)
POTASSIUM SERPL-SCNC: 3.6 MMOL/L (ref 3.5–4.5)
PROT SERPL-MCNC: 9.1 GM/DL (ref 6.2–8.1)
RBC # BLD AUTO: 3.57 M/MM3 (ref 4.2–5.6)
SODIUM SERPL-SCNC: 137 MMOL/L (ref 136–145)

## 2021-11-02 VITALS — SYSTOLIC BLOOD PRESSURE: 103 MMHG | TEMPERATURE: 97.6 F | DIASTOLIC BLOOD PRESSURE: 66 MMHG | HEART RATE: 86 BPM

## 2021-11-03 VITALS — SYSTOLIC BLOOD PRESSURE: 103 MMHG | DIASTOLIC BLOOD PRESSURE: 69 MMHG | TEMPERATURE: 97.9 F | HEART RATE: 94 BPM

## 2021-11-04 VITALS — DIASTOLIC BLOOD PRESSURE: 62 MMHG | SYSTOLIC BLOOD PRESSURE: 105 MMHG | HEART RATE: 92 BPM | TEMPERATURE: 97.3 F

## 2021-11-05 VITALS — SYSTOLIC BLOOD PRESSURE: 113 MMHG | HEART RATE: 101 BPM | DIASTOLIC BLOOD PRESSURE: 67 MMHG | TEMPERATURE: 97.6 F

## 2021-11-06 VITALS — SYSTOLIC BLOOD PRESSURE: 104 MMHG | HEART RATE: 86 BPM | TEMPERATURE: 98 F | DIASTOLIC BLOOD PRESSURE: 77 MMHG

## 2021-11-07 VITALS — TEMPERATURE: 98.3 F | HEART RATE: 90 BPM | SYSTOLIC BLOOD PRESSURE: 104 MMHG | DIASTOLIC BLOOD PRESSURE: 70 MMHG

## 2021-11-08 VITALS — HEART RATE: 85 BPM | TEMPERATURE: 98.3 F | DIASTOLIC BLOOD PRESSURE: 64 MMHG | SYSTOLIC BLOOD PRESSURE: 98 MMHG

## 2021-11-08 LAB
ALBUMIN SERPL-MCNC: 2.6 GM/DL (ref 3.5–5)
ALP SERPL-CCNC: 68 U/L (ref 40–150)
ALT SERPL-CCNC: 16 U/L (ref 0–55)
ANION GAP SERPL CALC-SCNC: 8 MMOL/L (ref 7–16)
AST SERPL-CCNC: 15 U/L (ref 5–34)
BASOPHILS # BLD: 0.1 K/MM3 (ref 0–0.2)
BASOPHILS NFR BLD AUTO: 1.3 % (ref 0–2)
BILIRUB SERPL-MCNC: 0.3 MG/DL (ref 0.2–1.2)
BUN SERPL-MCNC: 7 MG/DL (ref 9–21)
CALCIUM SERPL-MCNC: 9.4 MG/DL (ref 8.4–10.2)
CHLORIDE SERPL-SCNC: 105 MMOL/L (ref 98–107)
CO2 SERPL-SCNC: 23 MMOL/L (ref 22–29)
CREAT SERPL-SCNC: 0.69 MG/DL (ref 0.72–1.25)
CRP SERPL-MCNC: 4.49 MG/DL (ref 0–0.5)
EOSINOPHIL # BLD: 0.3 K/MM3 (ref 0–0.7)
EOSINOPHIL NFR BLD: 4.4 % (ref 0–4)
ERYTHROCYTE [DISTWIDTH] IN BLOOD BY AUTOMATED COUNT: 18.6 % (ref 11.5–14.5)
ERYTHROCYTE [SEDIMENTATION RATE] IN BLOOD: 111 MM/HR (ref 0–15)
GLUCOSE SERPL-MCNC: 85 MG/DL (ref 70–99)
GRANULOCYTES # BLD AUTO: 59.1 % (ref 42.2–75.2)
HCT VFR BLD AUTO: 30.6 % (ref 42–52)
HGB BLD-MCNC: 9 G/DL (ref 13.5–18)
LYMPHOCYTES # BLD: 1.9 K/MM3 (ref 1.2–3.4)
LYMPHOCYTES NFR BLD: 28.1 % (ref 20–51)
MCH RBC QN AUTO: 25 PG (ref 27–31)
MCHC RBC AUTO-ENTMCNC: 29 G/DL (ref 33–37)
MCV RBC AUTO: 84 FL (ref 80–100)
MONOCYTES # BLD: 0.5 K/MM3 (ref 0.1–0.6)
MONOCYTES NFR BLD AUTO: 7 % (ref 1.7–9.3)
NEUTROPHILS # BLD: 4 K/MM3 (ref 1.4–6.5)
PLATELET # BLD AUTO: 498 K/MM3 (ref 130–400)
PMV BLD AUTO: 9 FL (ref 7.4–10.4)
POTASSIUM SERPL-SCNC: 3.9 MMOL/L (ref 3.5–4.5)
PROT SERPL-MCNC: 8.8 GM/DL (ref 6.2–8.1)
RBC # BLD AUTO: 3.64 M/MM3 (ref 4.2–5.6)
SODIUM SERPL-SCNC: 136 MMOL/L (ref 136–145)

## 2021-11-09 VITALS — HEART RATE: 95 BPM | SYSTOLIC BLOOD PRESSURE: 108 MMHG | TEMPERATURE: 97.5 F | DIASTOLIC BLOOD PRESSURE: 72 MMHG

## 2021-11-10 ENCOUNTER — HOSPITAL ENCOUNTER (OUTPATIENT)
Dept: HOSPITAL 19 - COL.RAD | Age: 36
End: 2021-11-10
Attending: SURGERY
Payer: COMMERCIAL

## 2021-11-10 VITALS — TEMPERATURE: 97.8 F | HEART RATE: 80 BPM | DIASTOLIC BLOOD PRESSURE: 63 MMHG | SYSTOLIC BLOOD PRESSURE: 114 MMHG

## 2021-11-10 DIAGNOSIS — K65.1: Primary | ICD-10-CM

## 2021-11-11 VITALS — HEART RATE: 93 BPM | DIASTOLIC BLOOD PRESSURE: 68 MMHG | SYSTOLIC BLOOD PRESSURE: 111 MMHG | TEMPERATURE: 98.5 F

## 2021-11-12 VITALS — DIASTOLIC BLOOD PRESSURE: 74 MMHG | TEMPERATURE: 98.6 F | HEART RATE: 93 BPM | SYSTOLIC BLOOD PRESSURE: 113 MMHG

## 2021-11-12 NOTE — NUR
Cathflo was instilled into both lumens of PICC line and allowed to dwell for
30 mins. No blood return after initial dwell time. Blood return will be
reassessed after additional 1 hr dwell time. Pt expresses understanding.

## 2021-11-15 VITALS — SYSTOLIC BLOOD PRESSURE: 101 MMHG | TEMPERATURE: 98.4 F | DIASTOLIC BLOOD PRESSURE: 70 MMHG | HEART RATE: 97 BPM

## 2021-11-15 LAB
ALBUMIN SERPL-MCNC: 3.1 GM/DL (ref 3.5–5)
ALP SERPL-CCNC: 82 U/L (ref 40–150)
ALT SERPL-CCNC: 13 U/L (ref 0–55)
ANION GAP SERPL CALC-SCNC: 9 MMOL/L (ref 7–16)
AST SERPL-CCNC: 15 U/L (ref 5–34)
BASOPHILS # BLD: 0.1 K/MM3 (ref 0–0.2)
BASOPHILS NFR BLD AUTO: 0.7 % (ref 0–2)
BILIRUB SERPL-MCNC: 0.3 MG/DL (ref 0.2–1.2)
BUN SERPL-MCNC: 12 MG/DL (ref 9–21)
CALCIUM SERPL-MCNC: 9.6 MG/DL (ref 8.4–10.2)
CHLORIDE SERPL-SCNC: 105 MMOL/L (ref 98–107)
CO2 SERPL-SCNC: 21 MMOL/L (ref 22–29)
CREAT SERPL-SCNC: 0.72 MG/DL (ref 0.72–1.25)
CRP SERPL-MCNC: 3.93 MG/DL (ref 0–0.5)
EOSINOPHIL # BLD: 0.1 K/MM3 (ref 0–0.7)
EOSINOPHIL NFR BLD: 0.9 % (ref 0–4)
ERYTHROCYTE [DISTWIDTH] IN BLOOD BY AUTOMATED COUNT: 18.3 % (ref 11.5–14.5)
ERYTHROCYTE [SEDIMENTATION RATE] IN BLOOD: 78 MM/HR (ref 0–15)
GLUCOSE SERPL-MCNC: 90 MG/DL (ref 70–99)
GRANULOCYTES # BLD AUTO: 78.2 % (ref 42.2–75.2)
HCT VFR BLD AUTO: 33.6 % (ref 42–52)
HGB BLD-MCNC: 10.2 G/DL (ref 13.5–18)
LYMPHOCYTES # BLD: 2 K/MM3 (ref 1.2–3.4)
LYMPHOCYTES NFR BLD: 14 % (ref 20–51)
MCH RBC QN AUTO: 25 PG (ref 27–31)
MCHC RBC AUTO-ENTMCNC: 30 G/DL (ref 33–37)
MCV RBC AUTO: 83 FL (ref 80–100)
MONOCYTES # BLD: 0.9 K/MM3 (ref 0.1–0.6)
MONOCYTES NFR BLD AUTO: 6 % (ref 1.7–9.3)
NEUTROPHILS # BLD: 11.2 K/MM3 (ref 1.4–6.5)
PLATELET # BLD AUTO: 469 K/MM3 (ref 130–400)
PMV BLD AUTO: 9 FL (ref 7.4–10.4)
POTASSIUM SERPL-SCNC: 4 MMOL/L (ref 3.5–4.5)
PROT SERPL-MCNC: 8.9 GM/DL (ref 6.2–8.1)
RBC # BLD AUTO: 4.04 M/MM3 (ref 4.2–5.6)
SODIUM SERPL-SCNC: 135 MMOL/L (ref 136–145)

## 2021-11-16 VITALS — SYSTOLIC BLOOD PRESSURE: 101 MMHG | HEART RATE: 84 BPM | TEMPERATURE: 98 F | DIASTOLIC BLOOD PRESSURE: 68 MMHG

## 2021-11-17 VITALS — HEART RATE: 85 BPM | DIASTOLIC BLOOD PRESSURE: 68 MMHG | SYSTOLIC BLOOD PRESSURE: 102 MMHG | TEMPERATURE: 98.1 F

## 2021-11-18 VITALS — TEMPERATURE: 97.9 F | HEART RATE: 85 BPM | DIASTOLIC BLOOD PRESSURE: 68 MMHG | SYSTOLIC BLOOD PRESSURE: 126 MMHG

## 2021-11-19 VITALS — DIASTOLIC BLOOD PRESSURE: 67 MMHG | HEART RATE: 83 BPM | TEMPERATURE: 98 F | SYSTOLIC BLOOD PRESSURE: 109 MMHG

## 2021-11-20 VITALS — SYSTOLIC BLOOD PRESSURE: 127 MMHG | TEMPERATURE: 98.1 F | DIASTOLIC BLOOD PRESSURE: 81 MMHG | HEART RATE: 82 BPM

## 2021-11-22 VITALS — DIASTOLIC BLOOD PRESSURE: 77 MMHG | HEART RATE: 88 BPM | TEMPERATURE: 98.4 F | SYSTOLIC BLOOD PRESSURE: 119 MMHG

## 2021-11-22 LAB
ALBUMIN SERPL-MCNC: 3 GM/DL (ref 3.5–5)
ALP SERPL-CCNC: 78 U/L (ref 40–150)
ALT SERPL-CCNC: 19 U/L (ref 0–55)
ANION GAP SERPL CALC-SCNC: 10 MMOL/L (ref 7–16)
AST SERPL-CCNC: 17 U/L (ref 5–34)
BASOPHILS # BLD: 0.1 K/MM3 (ref 0–0.2)
BASOPHILS NFR BLD AUTO: 1.5 % (ref 0–2)
BILIRUB SERPL-MCNC: 0.2 MG/DL (ref 0.2–1.2)
BUN SERPL-MCNC: 6 MG/DL (ref 9–21)
CALCIUM SERPL-MCNC: 9.6 MG/DL (ref 8.4–10.2)
CHLORIDE SERPL-SCNC: 106 MMOL/L (ref 98–107)
CO2 SERPL-SCNC: 24 MMOL/L (ref 22–29)
CREAT SERPL-SCNC: 0.71 MG/DL (ref 0.72–1.25)
CRP SERPL-MCNC: 5.24 MG/DL (ref 0–0.5)
EOSINOPHIL # BLD: 0.2 K/MM3 (ref 0–0.7)
EOSINOPHIL NFR BLD: 2.9 % (ref 0–4)
ERYTHROCYTE [DISTWIDTH] IN BLOOD BY AUTOMATED COUNT: 17.2 % (ref 11.5–14.5)
ERYTHROCYTE [SEDIMENTATION RATE] IN BLOOD: 78 MM/HR (ref 0–15)
GLUCOSE SERPL-MCNC: 92 MG/DL (ref 70–99)
GRANULOCYTES # BLD AUTO: 56.3 % (ref 42.2–75.2)
HCT VFR BLD AUTO: 33.8 % (ref 42–52)
HGB BLD-MCNC: 10.3 G/DL (ref 13.5–18)
LYMPHOCYTES # BLD: 1.9 K/MM3 (ref 1.2–3.4)
LYMPHOCYTES NFR BLD: 32.9 % (ref 20–51)
MCH RBC QN AUTO: 25 PG (ref 27–31)
MCHC RBC AUTO-ENTMCNC: 31 G/DL (ref 33–37)
MCV RBC AUTO: 82 FL (ref 80–100)
MONOCYTES # BLD: 0.4 K/MM3 (ref 0.1–0.6)
MONOCYTES NFR BLD AUTO: 6.2 % (ref 1.7–9.3)
NEUTROPHILS # BLD: 3.3 K/MM3 (ref 1.4–6.5)
PLATELET # BLD AUTO: 457 K/MM3 (ref 130–400)
PMV BLD AUTO: 9.3 FL (ref 7.4–10.4)
POTASSIUM SERPL-SCNC: 3.7 MMOL/L (ref 3.5–4.5)
PROT SERPL-MCNC: 8.3 GM/DL (ref 6.2–8.1)
RBC # BLD AUTO: 4.12 M/MM3 (ref 4.2–5.6)
SODIUM SERPL-SCNC: 140 MMOL/L (ref 136–145)

## 2021-11-23 VITALS — TEMPERATURE: 97.5 F | DIASTOLIC BLOOD PRESSURE: 68 MMHG | HEART RATE: 78 BPM | SYSTOLIC BLOOD PRESSURE: 107 MMHG

## 2021-11-24 VITALS — HEART RATE: 92 BPM | DIASTOLIC BLOOD PRESSURE: 75 MMHG | SYSTOLIC BLOOD PRESSURE: 111 MMHG | TEMPERATURE: 97.7 F

## 2021-11-26 VITALS — DIASTOLIC BLOOD PRESSURE: 71 MMHG | HEART RATE: 88 BPM | TEMPERATURE: 98 F | SYSTOLIC BLOOD PRESSURE: 107 MMHG

## 2021-11-29 VITALS — TEMPERATURE: 98 F | HEART RATE: 88 BPM | SYSTOLIC BLOOD PRESSURE: 115 MMHG | DIASTOLIC BLOOD PRESSURE: 77 MMHG

## 2021-11-29 LAB
ALBUMIN SERPL-MCNC: 3 GM/DL (ref 3.5–5)
ALP SERPL-CCNC: 79 U/L (ref 40–150)
ALT SERPL-CCNC: 21 U/L (ref 0–55)
ANION GAP SERPL CALC-SCNC: 6 MMOL/L (ref 7–16)
AST SERPL-CCNC: 17 U/L (ref 5–34)
BASOPHILS # BLD: 0.1 K/MM3 (ref 0–0.2)
BASOPHILS NFR BLD AUTO: 1.1 % (ref 0–2)
BILIRUB SERPL-MCNC: 0.3 MG/DL (ref 0.2–1.2)
BUN SERPL-MCNC: 8 MG/DL (ref 9–21)
CALCIUM SERPL-MCNC: 8.8 MG/DL (ref 8.4–10.2)
CHLORIDE SERPL-SCNC: 109 MMOL/L (ref 98–107)
CO2 SERPL-SCNC: 24 MMOL/L (ref 22–29)
CREAT SERPL-SCNC: 0.65 MG/DL (ref 0.72–1.25)
CRP SERPL-MCNC: 1.37 MG/DL (ref 0–0.5)
EOSINOPHIL # BLD: 0.3 K/MM3 (ref 0–0.7)
EOSINOPHIL NFR BLD: 3.5 % (ref 0–4)
ERYTHROCYTE [DISTWIDTH] IN BLOOD BY AUTOMATED COUNT: 18.3 % (ref 11.5–14.5)
ERYTHROCYTE [SEDIMENTATION RATE] IN BLOOD: 27 MM/HR (ref 0–15)
GLUCOSE SERPL-MCNC: 83 MG/DL (ref 70–99)
GRANULOCYTES # BLD AUTO: 62.1 % (ref 42.2–75.2)
HCT VFR BLD AUTO: 34.2 % (ref 42–52)
HGB BLD-MCNC: 10.7 G/DL (ref 13.5–18)
LYMPHOCYTES # BLD: 2.5 K/MM3 (ref 1.2–3.4)
LYMPHOCYTES NFR BLD: 27.3 % (ref 20–51)
MCH RBC QN AUTO: 25 PG (ref 27–31)
MCHC RBC AUTO-ENTMCNC: 31 G/DL (ref 33–37)
MCV RBC AUTO: 81 FL (ref 80–100)
MONOCYTES # BLD: 0.5 K/MM3 (ref 0.1–0.6)
MONOCYTES NFR BLD AUTO: 5.8 % (ref 1.7–9.3)
NEUTROPHILS # BLD: 5.7 K/MM3 (ref 1.4–6.5)
PLATELET # BLD AUTO: 426 K/MM3 (ref 130–400)
PMV BLD AUTO: 9.1 FL (ref 7.4–10.4)
POTASSIUM SERPL-SCNC: 3.9 MMOL/L (ref 3.5–4.5)
PROT SERPL-MCNC: 7.6 GM/DL (ref 6.2–8.1)
RBC # BLD AUTO: 4.22 M/MM3 (ref 4.2–5.6)
SODIUM SERPL-SCNC: 139 MMOL/L (ref 136–145)

## 2021-11-30 VITALS — HEART RATE: 82 BPM | DIASTOLIC BLOOD PRESSURE: 72 MMHG | SYSTOLIC BLOOD PRESSURE: 130 MMHG | TEMPERATURE: 98 F

## 2021-12-01 VITALS — DIASTOLIC BLOOD PRESSURE: 77 MMHG | HEART RATE: 90 BPM | SYSTOLIC BLOOD PRESSURE: 119 MMHG

## 2021-12-02 VITALS — DIASTOLIC BLOOD PRESSURE: 71 MMHG | HEART RATE: 89 BPM | SYSTOLIC BLOOD PRESSURE: 105 MMHG | TEMPERATURE: 97.8 F

## 2021-12-03 ENCOUNTER — HOSPITAL ENCOUNTER (OUTPATIENT)
Dept: HOSPITAL 19 - EUO | Age: 36
Discharge: HOME | End: 2021-12-03
Attending: SURGERY
Payer: COMMERCIAL

## 2021-12-03 VITALS — BODY MASS INDEX: 43.98 KG/M2 | WEIGHT: 296.96 LBS | HEIGHT: 69.02 IN

## 2021-12-03 VITALS — DIASTOLIC BLOOD PRESSURE: 74 MMHG | HEART RATE: 87 BPM | SYSTOLIC BLOOD PRESSURE: 111 MMHG | TEMPERATURE: 97.7 F

## 2021-12-03 DIAGNOSIS — Z45.2: Primary | ICD-10-CM

## 2021-12-20 ENCOUNTER — HOSPITAL ENCOUNTER (OUTPATIENT)
Dept: HOSPITAL 19 - COL.RAD | Age: 36
End: 2021-12-20
Attending: SURGERY
Payer: COMMERCIAL

## 2021-12-20 DIAGNOSIS — K65.1: Primary | ICD-10-CM

## 2022-02-14 ENCOUNTER — HOSPITAL ENCOUNTER (OUTPATIENT)
Dept: HOSPITAL 19 - COL.LAB | Age: 37
End: 2022-02-14
Attending: SURGERY
Payer: COMMERCIAL

## 2022-02-14 DIAGNOSIS — Z87.19: ICD-10-CM

## 2022-02-14 DIAGNOSIS — R10.9: Primary | ICD-10-CM

## 2022-02-14 LAB
ERYTHROCYTE [DISTWIDTH] IN BLOOD BY AUTOMATED COUNT: 16.3 % (ref 11.5–14.5)
HCT VFR BLD AUTO: 41.8 % (ref 42–52)
HGB BLD-MCNC: 13.6 G/DL (ref 13.5–18)
MCH RBC QN AUTO: 26 PG (ref 27–31)
MCHC RBC AUTO-ENTMCNC: 33 G/DL (ref 33–37)
MCV RBC AUTO: 81 FL (ref 80–100)
PLATELET # BLD AUTO: 391 K/MM3 (ref 130–400)
PMV BLD AUTO: 8.9 FL (ref 7.4–10.4)
RBC # BLD AUTO: 5.18 M/MM3 (ref 4.2–5.6)

## 2022-02-15 ENCOUNTER — HOSPITAL ENCOUNTER (OUTPATIENT)
Dept: HOSPITAL 19 - COL.RAD | Age: 37
End: 2022-02-15
Attending: SURGERY
Payer: COMMERCIAL

## 2022-02-15 DIAGNOSIS — R10.9: ICD-10-CM

## 2022-02-15 DIAGNOSIS — K66.8: ICD-10-CM

## 2022-02-15 DIAGNOSIS — K42.9: Primary | ICD-10-CM

## 2022-02-24 ENCOUNTER — HOSPITAL ENCOUNTER (OUTPATIENT)
Dept: HOSPITAL 19 - COL.RAD | Age: 37
End: 2022-02-24
Attending: SURGERY
Payer: COMMERCIAL

## 2022-02-24 DIAGNOSIS — R10.32: Primary | ICD-10-CM

## 2022-04-15 ENCOUNTER — HOSPITAL ENCOUNTER (OUTPATIENT)
Dept: HOSPITAL 19 - ZCOL.LAB | Age: 37
End: 2022-04-15
Attending: SURGERY
Payer: COMMERCIAL

## 2022-04-15 DIAGNOSIS — Z01.89: Primary | ICD-10-CM

## 2022-05-05 ENCOUNTER — HOSPITAL ENCOUNTER (OUTPATIENT)
Dept: HOSPITAL 19 - COL.RAD | Age: 37
End: 2022-05-05
Payer: COMMERCIAL

## 2022-05-05 DIAGNOSIS — K57.32: Primary | ICD-10-CM

## 2022-06-23 ENCOUNTER — HOSPITAL ENCOUNTER (OUTPATIENT)
Dept: HOSPITAL 19 - ZCOL.LAB | Age: 37
End: 2022-06-23
Attending: SURGERY
Payer: COMMERCIAL

## 2022-06-23 DIAGNOSIS — S31.109A: ICD-10-CM

## 2022-06-23 DIAGNOSIS — T81.49XA: Primary | ICD-10-CM

## 2022-07-13 ENCOUNTER — HOSPITAL ENCOUNTER (OUTPATIENT)
Dept: HOSPITAL 19 - COL.LAB | Age: 37
End: 2022-07-13
Attending: SURGERY
Payer: COMMERCIAL

## 2022-07-13 DIAGNOSIS — T81.49XA: ICD-10-CM

## 2022-07-13 DIAGNOSIS — R10.9: Primary | ICD-10-CM

## 2022-07-13 LAB
ALBUMIN SERPL-MCNC: 3.5 GM/DL (ref 3.5–5)
ALP SERPL-CCNC: 91 U/L (ref 40–150)
ALT SERPL-CCNC: 25 U/L (ref 0–55)
ANION GAP SERPL CALC-SCNC: 11 MMOL/L (ref 7–16)
AST SERPL-CCNC: 16 U/L (ref 5–34)
BILIRUB SERPL-MCNC: 0.3 MG/DL (ref 0.2–1.2)
BUN SERPL-MCNC: 14 MG/DL (ref 9–21)
CALCIUM SERPL-MCNC: 9.2 MG/DL (ref 8.4–10.2)
CHLORIDE SERPL-SCNC: 105 MMOL/L (ref 98–107)
CO2 SERPL-SCNC: 24 MMOL/L (ref 22–29)
CREAT SERPL-SCNC: 0.76 MG/DL (ref 0.72–1.25)
ERYTHROCYTE [DISTWIDTH] IN BLOOD BY AUTOMATED COUNT: 15.3 % (ref 11.5–14.5)
GLUCOSE SERPL-MCNC: 86 MG/DL (ref 70–99)
HCT VFR BLD AUTO: 39 % (ref 42–52)
HGB BLD-MCNC: 12.6 G/DL (ref 13.5–18)
MCH RBC QN AUTO: 27 PG (ref 27–31)
MCHC RBC AUTO-ENTMCNC: 32 G/DL (ref 33–37)
MCV RBC AUTO: 83 FL (ref 80–100)
PLATELET # BLD AUTO: 389 K/MM3 (ref 130–400)
PMV BLD AUTO: 8.7 FL (ref 7.4–10.4)
POTASSIUM SERPL-SCNC: 3.9 MMOL/L (ref 3.5–4.5)
PROT SERPL-MCNC: 7.8 GM/DL (ref 6.2–8.1)
RBC # BLD AUTO: 4.7 M/MM3 (ref 4.2–5.6)
SODIUM SERPL-SCNC: 140 MMOL/L (ref 136–145)

## 2024-11-29 NOTE — NUR
-Monitor urine output closely   Critical WBC 30 called to - no new orders at this time. Patient
reports pain much improved. 4/10. Denies needing dilaudid. Denies  nausea.
Interested in clear liquids. Broth and jello provided. He is very concerned
about having and empty stomach. He is also concerned with needing to have pass
flatus.